# Patient Record
Sex: FEMALE | Race: WHITE | NOT HISPANIC OR LATINO | Employment: FULL TIME | URBAN - METROPOLITAN AREA
[De-identification: names, ages, dates, MRNs, and addresses within clinical notes are randomized per-mention and may not be internally consistent; named-entity substitution may affect disease eponyms.]

---

## 2018-11-10 ENCOUNTER — OFFICE VISIT (OUTPATIENT)
Dept: URGENT CARE | Facility: CLINIC | Age: 23
End: 2018-11-10
Payer: COMMERCIAL

## 2018-11-10 VITALS
HEART RATE: 99 BPM | TEMPERATURE: 99 F | HEIGHT: 65 IN | BODY MASS INDEX: 25.83 KG/M2 | OXYGEN SATURATION: 96 % | WEIGHT: 155 LBS | RESPIRATION RATE: 16 BRPM | SYSTOLIC BLOOD PRESSURE: 122 MMHG | DIASTOLIC BLOOD PRESSURE: 80 MMHG

## 2018-11-10 DIAGNOSIS — J06.9 UPPER RESPIRATORY TRACT INFECTION, UNSPECIFIED TYPE: ICD-10-CM

## 2018-11-10 DIAGNOSIS — H66.004 RECURRENT ACUTE SUPPURATIVE OTITIS MEDIA OF RIGHT EAR WITHOUT SPONTANEOUS RUPTURE OF TYMPANIC MEMBRANE: Primary | ICD-10-CM

## 2018-11-10 PROCEDURE — 96372 THER/PROPH/DIAG INJ SC/IM: CPT | Mod: S$GLB,,, | Performed by: EMERGENCY MEDICINE

## 2018-11-10 PROCEDURE — 99214 OFFICE O/P EST MOD 30 MIN: CPT | Mod: 25,S$GLB,, | Performed by: EMERGENCY MEDICINE

## 2018-11-10 PROCEDURE — 3008F BODY MASS INDEX DOCD: CPT | Mod: CPTII,S$GLB,, | Performed by: EMERGENCY MEDICINE

## 2018-11-10 RX ORDER — AMOXICILLIN AND CLAVULANATE POTASSIUM 875; 125 MG/1; MG/1
1 TABLET, FILM COATED ORAL 2 TIMES DAILY
Qty: 20 TABLET | Refills: 0 | Status: SHIPPED | OUTPATIENT
Start: 2018-11-10 | End: 2018-11-20

## 2018-11-10 RX ORDER — BETAMETHASONE SODIUM PHOSPHATE AND BETAMETHASONE ACETATE 3; 3 MG/ML; MG/ML
9 INJECTION, SUSPENSION INTRA-ARTICULAR; INTRALESIONAL; INTRAMUSCULAR; SOFT TISSUE ONCE
Status: COMPLETED | OUTPATIENT
Start: 2018-11-10 | End: 2018-11-10

## 2018-11-10 RX ORDER — AMOXICILLIN AND CLAVULANATE POTASSIUM 875; 125 MG/1; MG/1
1 TABLET, FILM COATED ORAL 2 TIMES DAILY
Qty: 20 TABLET | Refills: 0 | Status: SHIPPED | OUTPATIENT
Start: 2018-11-10 | End: 2018-11-10 | Stop reason: SDUPTHER

## 2018-11-10 RX ADMIN — BETAMETHASONE SODIUM PHOSPHATE AND BETAMETHASONE ACETATE 9 MG: 3; 3 INJECTION, SUSPENSION INTRA-ARTICULAR; INTRALESIONAL; INTRAMUSCULAR; SOFT TISSUE at 06:11

## 2018-11-10 NOTE — LETTER
November 12, 2018      [facility] [address] [place]  [facility] [address]  Phone: [phone]  Fax: [phone]       Patient: Allyssa Leong   YOB: 1995  Date of Visit: 11/12/2018    To Whom It May Concern:    Ana Leong  was at [facility] on 11/10/18. She may return to school on 11/13/18 with no restrictions. If you have any questions or concerns, or if I can be of further assistance, please do not hesitate to contact me.    Sincerely,    Lara Pinon MA

## 2018-11-10 NOTE — PROGRESS NOTES
"Subjective:       Patient ID: Allyssa Leong is a 23 y.o. female.    Vitals:  height is 5' 5" (1.651 m) and weight is 70.3 kg (155 lb). Her oral temperature is 98.6 °F (37 °C). Her blood pressure is 122/80 and her pulse is 99. Her respiration is 16 and oxygen saturation is 96%.     Chief Complaint: Otitis Media (right ear pain)    Pt c/o right ear pain, cough, nausea, and nasal congestion, x 2 days      Otalgia    There is pain in the right ear. This is a new problem. The current episode started yesterday. The problem occurs constantly. The problem has been gradually worsening. There has been no fever. The pain is at a severity of 5/10. The pain is mild. Associated symptoms include coughing, rhinorrhea and a sore throat. Pertinent negatives include no abdominal pain, headaches (x 2 days), rash or vomiting. She has tried nothing for the symptoms.     Review of Systems   Constitution: Negative for chills and fever.   HENT: Positive for congestion (nasal congestion), ear pain (right ear), hoarse voice, rhinorrhea and sore throat.    Eyes: Negative for blurred vision.   Cardiovascular: Negative for chest pain.   Respiratory: Positive for cough. Negative for shortness of breath.    Skin: Negative for rash.   Musculoskeletal: Negative for back pain and joint pain.   Gastrointestinal: Positive for nausea. Negative for abdominal pain and vomiting.   Neurological: Negative for headaches (x 2 days).   Psychiatric/Behavioral: The patient is not nervous/anxious.        Objective:      Physical Exam   Constitutional: She is oriented to person, place, and time. She appears well-developed and well-nourished. She is cooperative.  Non-toxic appearance. She does not appear ill. No distress.   HENT:   Head: Normocephalic and atraumatic.   Right Ear: Hearing, external ear and ear canal normal. Tympanic membrane is injected, erythematous and bulging.   Left Ear: Hearing, tympanic membrane, external ear and ear canal normal.   Nose: " Mucosal edema and rhinorrhea present. No nasal deformity. No epistaxis. Right sinus exhibits no maxillary sinus tenderness and no frontal sinus tenderness. Left sinus exhibits no maxillary sinus tenderness and no frontal sinus tenderness.   Mouth/Throat: Uvula is midline, oropharynx is clear and moist and mucous membranes are normal. No trismus in the jaw. Normal dentition. No uvula swelling. No posterior oropharyngeal erythema.   Eyes: Conjunctivae and lids are normal. No scleral icterus.   Sclera clear bilat   Neck: Trachea normal, full passive range of motion without pain and phonation normal. Neck supple.   Cardiovascular: Normal rate, regular rhythm, normal heart sounds, intact distal pulses and normal pulses.   Pulmonary/Chest: Effort normal and breath sounds normal. No respiratory distress.   Abdominal: Soft. Normal appearance and bowel sounds are normal. She exhibits no distension. There is no tenderness.   Musculoskeletal: Normal range of motion. She exhibits no edema or deformity.   Neurological: She is alert and oriented to person, place, and time. She exhibits normal muscle tone. Coordination normal.   Skin: Skin is warm, dry and intact. She is not diaphoretic. No pallor.   Psychiatric: She has a normal mood and affect. Her speech is normal and behavior is normal. Judgment and thought content normal. Cognition and memory are normal.   Nursing note and vitals reviewed.      Assessment:       1. Recurrent acute suppurative otitis media of right ear without spontaneous rupture of tympanic membrane    2. Upper respiratory tract infection, unspecified type        Plan:         Recurrent acute suppurative otitis media of right ear without spontaneous rupture of tympanic membrane    Upper respiratory tract infection, unspecified type    Other orders  -     amoxicillin-clavulanate 875-125mg (AUGMENTIN) 875-125 mg per tablet; Take 1 tablet by mouth 2 (two) times daily. for 10 days  Dispense: 20 tablet; Refill:  0  -     betamethasone acetate-betamethasone sodium phosphate injection 9 mg          Patient Instructions   Go to the Emergency Room if symptoms or condition worsens in any way.    Zyrtec, Claritin, or Allegra OTC as directed for the next 7 days    Flonase OTC (or your regular steroid nasal spray) as directed for the next 7 days    Salt Water Nasal Spray OTC 3x/day for the next 7 days    Tylenol 500mg 2 tabs by mouth every 8 hours  Motrin 400mg 2 tabs by mouth every 8 hours  Alternate Tylenol and Motrin every 4hours      Follow up with Primary Care or ENT if not improved in 7-10 Days:  [id number]      Middle Ear Infection (Adult)  You have an infection of the middle ear, the space behind the eardrum. This is also called acute otitis media (AOM). Sometimes it is caused by the common cold. This is because congestion can block the internal passage (eustachian tube) that drains fluid from the middle ear. When the middle ear fills with fluid, bacteria can grow there and cause an infection. Oral antibiotics are used to treat this illness, not ear drops. Symptoms usually start to improve within 1 to 2 days of treatment.    Home care  The following are general care guidelines:  · Finish all of the antibiotic medicine given, even though you may feel better after the first few days.  · You may use over-the-counter medicine, such as acetaminophen or ibuprofen, to control pain and fever, unless something else was prescribed. If you have chronic liver or kidney disease or have ever had a stomach ulcer or gastrointestinal bleeding, talk with your healthcare provider before using these medicines. Do not give aspirin to anyone under 18 years of age who has a fever. It may cause severe illness or death.  Follow-up care  Follow up with your healthcare provider, or as advised, in 2 weeks if all symptoms have not gotten better, or if hearing doesn't go back to normal within 1 month.  When to seek medical advice  Call your healthcare  provider right away if any of these occur:  · Ear pain gets worse or does not improve after 3 days of treatment  · Unusual drowsiness or confusion  · Neck pain, stiff neck, or headache  · Fluid or blood draining from the ear canal  · Fever of 100.4°F (38°C) or as advised   · Seizure  Date Last Reviewed: 6/1/2016  © 6895-1008 [employer]. [address] [place] [address], [place] [address]. All rights reserved. This information is not intended as a substitute for professional medical care. Always follow your healthcare professional's instructions.

## 2018-11-10 NOTE — PATIENT INSTRUCTIONS
Go to the Emergency Room if symptoms or condition worsens in any way.    Zyrtec, Claritin, or Allegra OTC as directed for the next 7 days    Flonase OTC (or your regular steroid nasal spray) as directed for the next 7 days    Salt Water Nasal Spray OTC 3x/day for the next 7 days    Tylenol 500mg 2 tabs by mouth every 8 hours  Motrin 400mg 2 tabs by mouth every 8 hours  Alternate Tylenol and Motrin every 4hours      Follow up with Primary Care or ENT if not improved in 7-10 Days:  [phone]      Middle Ear Infection (Adult)  You have an infection of the middle ear, the space behind the eardrum. This is also called acute otitis media (AOM). Sometimes it is caused by the common cold. This is because congestion can block the internal passage (eustachian tube) that drains fluid from the middle ear. When the middle ear fills with fluid, bacteria can grow there and cause an infection. Oral antibiotics are used to treat this illness, not ear drops. Symptoms usually start to improve within 1 to 2 days of treatment.    Home care  The following are general care guidelines:  · Finish all of the antibiotic medicine given, even though you may feel better after the first few days.  · You may use over-the-counter medicine, such as acetaminophen or ibuprofen, to control pain and fever, unless something else was prescribed. If you have chronic liver or kidney disease or have ever had a stomach ulcer or gastrointestinal bleeding, talk with your healthcare provider before using these medicines. Do not give aspirin to anyone under 18 years of age who has a fever. It may cause severe illness or death.  Follow-up care  Follow up with your healthcare provider, or as advised, in 2 weeks if all symptoms have not gotten better, or if hearing doesn't go back to normal within 1 month.  When to seek medical advice  Call your healthcare provider right away if any of these occur:  · Ear pain gets worse or does not improve after 3 days of  treatment  · Unusual drowsiness or confusion  · Neck pain, stiff neck, or headache  · Fluid or blood draining from the ear canal  · Fever of 100.4°F (38°C) or as advised   · Seizure  Date Last Reviewed: 6/1/2016  © 9110-0691 [employer]. [address] [place] [address]. All rights reserved. This information is not intended as a substitute for professional medical care. Always follow your healthcare professional's instructions.

## 2018-11-16 ENCOUNTER — TELEPHONE (OUTPATIENT)
Dept: URGENT CARE | Facility: CLINIC | Age: 23
End: 2018-11-16

## 2019-06-06 ENCOUNTER — OFFICE VISIT (OUTPATIENT)
Dept: URGENT CARE | Facility: CLINIC | Age: 24
End: 2019-06-06
Payer: COMMERCIAL

## 2019-06-06 VITALS
TEMPERATURE: 97 F | SYSTOLIC BLOOD PRESSURE: 109 MMHG | BODY MASS INDEX: 25.83 KG/M2 | DIASTOLIC BLOOD PRESSURE: 73 MMHG | RESPIRATION RATE: 16 BRPM | OXYGEN SATURATION: 99 % | WEIGHT: 155 LBS | HEART RATE: 91 BPM | HEIGHT: 65 IN

## 2019-06-06 DIAGNOSIS — J01.41 ACUTE RECURRENT PANSINUSITIS: Primary | ICD-10-CM

## 2019-06-06 PROBLEM — N94.10 DYSPAREUNIA, FEMALE: Status: ACTIVE | Noted: 2019-05-15

## 2019-06-06 PROBLEM — R10.2 PELVIC PAIN IN FEMALE: Status: ACTIVE | Noted: 2019-05-15

## 2019-06-06 PROBLEM — N92.0 MENORRHAGIA WITH REGULAR CYCLE: Status: ACTIVE | Noted: 2019-05-15

## 2019-06-06 PROBLEM — N60.02 CYST OF BREAST, LEFT, SOLITARY: Status: ACTIVE | Noted: 2018-10-12

## 2019-06-06 PROBLEM — N94.6 DYSMENORRHEA: Status: ACTIVE | Noted: 2019-05-15

## 2019-06-06 PROBLEM — N94.2 VAGINOSPASM: Status: ACTIVE | Noted: 2018-10-12

## 2019-06-06 PROBLEM — Z01.419 ENCOUNTER FOR GYNECOLOGICAL EXAMINATION WITHOUT ABNORMAL FINDING: Status: ACTIVE | Noted: 2018-10-12

## 2019-06-06 PROCEDURE — 99214 PR OFFICE/OUTPT VISIT, EST, LEVL IV, 30-39 MIN: ICD-10-PCS | Mod: 25,S$GLB,, | Performed by: FAMILY MEDICINE

## 2019-06-06 PROCEDURE — 3008F BODY MASS INDEX DOCD: CPT | Mod: CPTII,S$GLB,, | Performed by: FAMILY MEDICINE

## 2019-06-06 PROCEDURE — 96372 THER/PROPH/DIAG INJ SC/IM: CPT | Mod: S$GLB,,, | Performed by: FAMILY MEDICINE

## 2019-06-06 PROCEDURE — 96372 PR INJECTION,THERAP/PROPH/DIAG2ST, IM OR SUBCUT: ICD-10-PCS | Mod: S$GLB,,, | Performed by: FAMILY MEDICINE

## 2019-06-06 PROCEDURE — 99214 OFFICE O/P EST MOD 30 MIN: CPT | Mod: 25,S$GLB,, | Performed by: FAMILY MEDICINE

## 2019-06-06 PROCEDURE — 3008F PR BODY MASS INDEX (BMI) DOCUMENTED: ICD-10-PCS | Mod: CPTII,S$GLB,, | Performed by: FAMILY MEDICINE

## 2019-06-06 RX ORDER — AMOXICILLIN AND CLAVULANATE POTASSIUM 875; 125 MG/1; MG/1
1 TABLET, FILM COATED ORAL EVERY 12 HOURS
Qty: 14 TABLET | Refills: 0 | Status: SHIPPED | OUTPATIENT
Start: 2019-06-06 | End: 2019-06-13

## 2019-06-06 RX ORDER — BETAMETHASONE SODIUM PHOSPHATE AND BETAMETHASONE ACETATE 3; 3 MG/ML; MG/ML
6 INJECTION, SUSPENSION INTRA-ARTICULAR; INTRALESIONAL; INTRAMUSCULAR; SOFT TISSUE
Status: COMPLETED | OUTPATIENT
Start: 2019-06-06 | End: 2019-06-06

## 2019-06-06 RX ADMIN — BETAMETHASONE SODIUM PHOSPHATE AND BETAMETHASONE ACETATE 6 MG: 3; 3 INJECTION, SUSPENSION INTRA-ARTICULAR; INTRALESIONAL; INTRAMUSCULAR; SOFT TISSUE at 10:06

## 2019-06-06 NOTE — PROGRESS NOTES
"Subjective:       Patient ID: Allyssa Leong is a 23 y.o. female.    Vitals:    06/06/19 0925   BP: 109/73   Pulse: 91   Resp: 16   Temp: 97.1 °F (36.2 °C)   TempSrc: Oral   SpO2: 99%   Weight: 70.3 kg (155 lb)   Height: 5' 5" (1.651 m)       Chief Complaint: URI    Pt states 2-3 days congestion. Pt states today pressure in ears, sinus pain/pressure, HA, sore throat, post nasal drip. No relief with benadryl and nasal spray she thinks astelin. ENT in  where she goes to school, [employer]. States if she lets this linger on most likely will get ear infection. Deals with this frequently. Steroid and abx works best. Can take augmentin without rxn.     URI    This is a new problem. The current episode started in the past 7 days. The problem has been gradually worsening. There has been no fever. Associated symptoms include congestion, ear pain, headaches and sinus pain. Pertinent negatives include no abdominal pain, chest pain, diarrhea, joint pain, nausea, rash, sore throat or vomiting. Treatments tried: benadryl  The treatment provided no relief.     Review of Systems   Constitution: Positive for malaise/fatigue. Negative for chills and fever.   HENT: Positive for congestion, ear pain and sinus pain. Negative for sore throat.    Eyes: Negative for blurred vision.   Cardiovascular: Negative for chest pain.   Respiratory: Negative for shortness of breath.    Skin: Negative for rash.   Musculoskeletal: Negative for back pain and joint pain.   Gastrointestinal: Negative for abdominal pain, diarrhea, nausea and vomiting.   Neurological: Positive for headaches.   Psychiatric/Behavioral: The patient is not nervous/anxious.        Objective:      Physical Exam   Constitutional: She is oriented to person, place, and time. She appears well-developed and well-nourished. She is cooperative.  Non-toxic appearance. She does not appear ill. No distress.   HENT:   Head: Normocephalic and atraumatic.   Right Ear: Hearing, tympanic " membrane, external ear and ear canal normal. Tympanic membrane is not erythematous. No middle ear effusion.   Left Ear: Hearing, external ear and ear canal normal. Tympanic membrane is bulging (clear). Tympanic membrane is not erythematous.  No middle ear effusion.   Nose: Mucosal edema and rhinorrhea (thick green) present. No nasal deformity. No epistaxis. Right sinus exhibits maxillary sinus tenderness and frontal sinus tenderness. Left sinus exhibits maxillary sinus tenderness and frontal sinus tenderness.   Mouth/Throat: Uvula is midline, oropharynx is clear and moist and mucous membranes are normal. No trismus in the jaw. Normal dentition. No uvula swelling. No oropharyngeal exudate or posterior oropharyngeal erythema.   Eyes: Conjunctivae and lids are normal. No scleral icterus.   Sclera clear bilat   Neck: Trachea normal, full passive range of motion without pain and phonation normal. Neck supple.   Cardiovascular: Normal rate, regular rhythm, normal heart sounds, intact distal pulses and normal pulses.   Pulmonary/Chest: Effort normal and breath sounds normal. No respiratory distress. She has no wheezes.   Abdominal: Soft. Normal appearance and bowel sounds are normal. She exhibits no distension. There is no tenderness.   Musculoskeletal: Normal range of motion. She exhibits no edema or deformity.   Neurological: She is alert and oriented to person, place, and time. She exhibits normal muscle tone. Coordination normal.   Skin: Skin is warm, dry and intact. She is not diaphoretic. No pallor.   Psychiatric: She has a normal mood and affect. Her speech is normal and behavior is normal. Judgment and thought content normal. Cognition and memory are normal.   Nursing note and vitals reviewed.      Assessment:       1. Acute recurrent pansinusitis        Plan:       Allyssa was seen today for uri.    Diagnoses and all orders for this visit:    Acute recurrent pansinusitis  -     betamethasone acetate-betamethasone  sodium phosphate injection 6 mg    Other orders  -     amoxicillin-clavulanate 875-125mg (AUGMENTIN) 875-125 mg per tablet; Take 1 tablet by mouth every 12 (twelve) hours. for 7 days    discussed risk with frequent steroids, last in march. She would like inj over PO medication as she has school work to do. Advised only take abx if severe ear pain, fever, sinus sx for 7-10 days and worsening. Pt in agreement    Patient Instructions   PLEASE READ YOUR DISCHARGE INSTRUCTIONS ENTIRELY AS IT CONTAINS IMPORTANT INFORMATION.      Please drink plenty of fluids.    Please get plenty of rest.    Please return here or go to the Emergency Department for any concerns or worsening of condition.    Please take an over the counter antihistamine medication (allegra/Claritin/Zyrtec) of your choice as directed.    Try an over the counter decongestant like Mucinex D or Sudafed.     If you do have Hypertension or palpitations, it is safe to take Coricidin HBP for relief of sinus symptoms.    If not allergic, please take over the counter Tylenol (Acetaminophen) and/or Motrin (Ibuprofen) as directed for control of pain and/or fever.  Please follow up with your primary care doctor or specialist as needed.    Sore throat recommendations: Warm fluids, warm salt water gargles, throat lozenges, tea, honey, soup, rest, hydration.    Use over the counter flonase: one spray each nostril twice daily OR two sprays each nostril once daily.     If you  smoke, please stop smoking.    You received a steroid today - this can elevate your blood pressure, elevate your blood sugar, water weight gain, nervous energy, redness to the face and dimpling of the skin where the shot goes in if you had an injection.   Do not use steroids more than 3 times per year.   If you have diabetes, please check you blood sugar frequently.  If you have high blood pressure, please check your blood pressure frequently.       Please return or see your primary care doctor if you  develop new or worsening symptoms.     Please arrange follow up with your primary medical clinic as soon as possible. You must understand that you've received an Urgent Care treatment only and that you may be released before all of your medical problems are known or treated. You, the patient, will arrange for follow up as instructed. If your symptoms worsen or fail to improve you should go to the Emergency Room.    Sinusitis (No Antibiotics)    The sinuses are air-filled spaces within the bones of the face. They connect to the inside of the nose. Sinusitis is an inflammation of the tissue lining the sinus cavity. Sinus inflammation can occur during a cold. It can also be due to allergies to pollens and other particles in the air. It can cause symptoms such as sinus congestion, headache, sore throat, facial swelling and fullness. It may also cause a low-grade fever. No infection is present, and no antibiotic treatment is needed.  Home care  · Drink plenty of water, hot tea, and other liquids. This may help thin mucus. It also may promote sinus drainage.  · Heat may help soothe painful areas of the face. Use a towel soaked in hot water. Or,  the shower and direct the hot spray onto your face. Using a vaporizer along with a menthol rub at night may also help.   · An expectorant containing guaifenesin may help thin the mucus and promote drainage from the sinuses.  · Over-the-counter decongestants may be used unless a similar medicine was prescribed. Nasal sprays work the fastest. Use one that contains phenylephrine or oxymetazoline. First blow the nose gently. Then use the spray. Do not use these medicines more often than directed on the label or symptoms may get worse. You may also use tablets containing pseudoephedrine. Avoid products that combine ingredients, because side effects may be increased. Read labels. You can also ask the pharmacist for help. (NOTE: Persons with high blood pressure should not use  decongestants. They can raise blood pressure.)  · Over-the-counter antihistamines may help if allergies contributed to your sinusitis.    · Use acetaminophen or ibuprofen to control pain, unless another pain medicine was prescribed. (If you have chronic liver or kidney disease or ever had a stomach ulcer, talk with your doctor before using these medicines. Aspirin should never be used in anyone under 18 years of age who is ill with a fever. It may cause severe liver damage.)  · Use nasal rinses or irrigation as instructed by your health care provider.  · Don't smoke. This can worsen symptoms.  Follow-up care  Follow up with your healthcare provider or our staff if you are not improving within the next week.  When to seek medical advice  Call your healthcare provider if any of these occur:  · Green or yellow discharge from the nose or into the throat  · Facial pain or headache becoming more severe  · Stiff neck  · Unusual drowsiness or confusion  · Swelling of the forehead or eyelids  · Vision problems, including blurred or double vision  · Fever of 100.4ºF (38ºC) or higher, or as directed by your healthcare provider  · Seizure  · Breathing problems  · Symptoms not resolving within 10 days  Date Last Reviewed: 4/13/2015  © 4985-5259 [employer]. [address] [place] [address], [place] [address]. All rights reserved. This information is not intended as a substitute for professional medical care. Always follow your healthcare professional's instructions.

## 2019-06-06 NOTE — PATIENT INSTRUCTIONS
PLEASE READ YOUR DISCHARGE INSTRUCTIONS ENTIRELY AS IT CONTAINS IMPORTANT INFORMATION.      Please drink plenty of fluids.    Please get plenty of rest.    Please return here or go to the Emergency Department for any concerns or worsening of condition.    Please take an over the counter antihistamine medication (allegra/Claritin/Zyrtec) of your choice as directed.    Try an over the counter decongestant like Mucinex D or Sudafed.     If you do have Hypertension or palpitations, it is safe to take Coricidin HBP for relief of sinus symptoms.    If not allergic, please take over the counter Tylenol (Acetaminophen) and/or Motrin (Ibuprofen) as directed for control of pain and/or fever.  Please follow up with your primary care doctor or specialist as needed.    Sore throat recommendations: Warm fluids, warm salt water gargles, throat lozenges, tea, honey, soup, rest, hydration.    Use over the counter flonase: one spray each nostril twice daily OR two sprays each nostril once daily.     If you  smoke, please stop smoking.    You received a steroid today - this can elevate your blood pressure, elevate your blood sugar, water weight gain, nervous energy, redness to the face and dimpling of the skin where the shot goes in if you had an injection.   Do not use steroids more than 3 times per year.   If you have diabetes, please check you blood sugar frequently.  If you have high blood pressure, please check your blood pressure frequently.       Please return or see your primary care doctor if you develop new or worsening symptoms.     Please arrange follow up with your primary medical clinic as soon as possible. You must understand that you've received an Urgent Care treatment only and that you may be released before all of your medical problems are known or treated. You, the patient, will arrange for follow up as instructed. If your symptoms worsen or fail to improve you should go to the Emergency Room.    Sinusitis (No  Antibiotics)    The sinuses are air-filled spaces within the bones of the face. They connect to the inside of the nose. Sinusitis is an inflammation of the tissue lining the sinus cavity. Sinus inflammation can occur during a cold. It can also be due to allergies to pollens and other particles in the air. It can cause symptoms such as sinus congestion, headache, sore throat, facial swelling and fullness. It may also cause a low-grade fever. No infection is present, and no antibiotic treatment is needed.  Home care  · Drink plenty of water, hot tea, and other liquids. This may help thin mucus. It also may promote sinus drainage.  · Heat may help soothe painful areas of the face. Use a towel soaked in hot water. Or,  the shower and direct the hot spray onto your face. Using a vaporizer along with a menthol rub at night may also help.   · An expectorant containing guaifenesin may help thin the mucus and promote drainage from the sinuses.  · Over-the-counter decongestants may be used unless a similar medicine was prescribed. Nasal sprays work the fastest. Use one that contains phenylephrine or oxymetazoline. First blow the nose gently. Then use the spray. Do not use these medicines more often than directed on the label or symptoms may get worse. You may also use tablets containing pseudoephedrine. Avoid products that combine ingredients, because side effects may be increased. Read labels. You can also ask the pharmacist for help. (NOTE: Persons with high blood pressure should not use decongestants. They can raise blood pressure.)  · Over-the-counter antihistamines may help if allergies contributed to your sinusitis.    · Use acetaminophen or ibuprofen to control pain, unless another pain medicine was prescribed. (If you have chronic liver or kidney disease or ever had a stomach ulcer, talk with your doctor before using these medicines. Aspirin should never be used in anyone under 18 years of age who is ill with  a fever. It may cause severe liver damage.)  · Use nasal rinses or irrigation as instructed by your health care provider.  · Don't smoke. This can worsen symptoms.  Follow-up care  Follow up with your healthcare provider or our staff if you are not improving within the next week.  When to seek medical advice  Call your healthcare provider if any of these occur:  · Green or yellow discharge from the nose or into the throat  · Facial pain or headache becoming more severe  · Stiff neck  · Unusual drowsiness or confusion  · Swelling of the forehead or eyelids  · Vision problems, including blurred or double vision  · Fever of 100.4ºF (38ºC) or higher, or as directed by your healthcare provider  · Seizure  · Breathing problems  · Symptoms not resolving within 10 days  Date Last Reviewed: 4/13/2015  © 5047-4443 [employer]. [address] [place] [address], [place] [address]. All rights reserved. This information is not intended as a substitute for professional medical care. Always follow your healthcare professional's instructions.

## 2020-06-15 ENCOUNTER — TELEPHONE (OUTPATIENT)
Dept: FAMILY MEDICINE | Facility: CLINIC | Age: 25
End: 2020-06-15

## 2020-06-15 NOTE — TELEPHONE ENCOUNTER
----- Message from Vale Tubbs sent at 6/12/2020  6:05 PM CDT -----  Contact: pt  Type:  Needs Medical Advice/ Appointment request    Who Called: Patient  Reason: checkup and mental health evaluation  Would the patient rather a call back or a response via [facility]? [facility]  Best Call Back Number: [phone]  Additional Information: new patient

## 2020-08-07 ENCOUNTER — PATIENT OUTREACH (OUTPATIENT)
Dept: ADMINISTRATIVE | Facility: HOSPITAL | Age: 25
End: 2020-08-07

## 2020-08-07 NOTE — PROGRESS NOTES
Chart review done.  updated. Immunizations reviewed & updated. Care Everywhere updated.   updated with pap results.

## 2020-08-21 ENCOUNTER — OFFICE VISIT (OUTPATIENT)
Dept: INTERNAL MEDICINE | Facility: CLINIC | Age: 25
End: 2020-08-21
Payer: COMMERCIAL

## 2020-08-21 ENCOUNTER — LAB VISIT (OUTPATIENT)
Dept: LAB | Facility: HOSPITAL | Age: 25
End: 2020-08-21
Attending: INTERNAL MEDICINE
Payer: COMMERCIAL

## 2020-08-21 VITALS
RESPIRATION RATE: 16 BRPM | HEART RATE: 71 BPM | TEMPERATURE: 98 F | BODY MASS INDEX: 30.12 KG/M2 | HEIGHT: 65 IN | SYSTOLIC BLOOD PRESSURE: 128 MMHG | WEIGHT: 180.75 LBS | OXYGEN SATURATION: 99 % | DIASTOLIC BLOOD PRESSURE: 80 MMHG

## 2020-08-21 DIAGNOSIS — F41.9 ANXIETY: ICD-10-CM

## 2020-08-21 DIAGNOSIS — Z00.00 ANNUAL PHYSICAL EXAM: Primary | ICD-10-CM

## 2020-08-21 DIAGNOSIS — R41.840 DIFFICULTY CONCENTRATING: ICD-10-CM

## 2020-08-21 DIAGNOSIS — Z00.00 ANNUAL PHYSICAL EXAM: ICD-10-CM

## 2020-08-21 PROCEDURE — 99999 PR PBB SHADOW E&M-EST. PATIENT-LVL IV: CPT | Mod: PBBFAC,,, | Performed by: INTERNAL MEDICINE

## 2020-08-21 PROCEDURE — 85025 COMPLETE CBC W/AUTO DIFF WBC: CPT

## 2020-08-21 PROCEDURE — 99385 PREV VISIT NEW AGE 18-39: CPT | Mod: S$GLB,,, | Performed by: INTERNAL MEDICINE

## 2020-08-21 PROCEDURE — 83036 HEMOGLOBIN GLYCOSYLATED A1C: CPT

## 2020-08-21 PROCEDURE — 99999 PR PBB SHADOW E&M-EST. PATIENT-LVL IV: ICD-10-PCS | Mod: PBBFAC,,, | Performed by: INTERNAL MEDICINE

## 2020-08-21 PROCEDURE — 82306 VITAMIN D 25 HYDROXY: CPT

## 2020-08-21 PROCEDURE — 80053 COMPREHEN METABOLIC PANEL: CPT

## 2020-08-21 PROCEDURE — 99385 PR PREVENTIVE VISIT,NEW,18-39: ICD-10-PCS | Mod: S$GLB,,, | Performed by: INTERNAL MEDICINE

## 2020-08-21 PROCEDURE — 36415 COLL VENOUS BLD VENIPUNCTURE: CPT | Mod: PO

## 2020-08-21 PROCEDURE — 80061 LIPID PANEL: CPT

## 2020-08-21 PROCEDURE — 84443 ASSAY THYROID STIM HORMONE: CPT

## 2020-08-21 RX ORDER — ELAGOLIX 200 MG/1
TABLET, FILM COATED ORAL
COMMUNITY
Start: 2020-08-10 | End: 2022-03-23

## 2020-08-21 RX ORDER — ESCITALOPRAM OXALATE 10 MG/1
TABLET ORAL
COMMUNITY
Start: 2020-06-24 | End: 2021-03-25

## 2020-08-21 NOTE — PROGRESS NOTES
Subjective:       Patient ID: Allyssa Leong is a 25 y.o. female.    Chief Complaint: Establish Care, Weight Gain, and Anxiety    HPI     25 y.o. female here for annual exam.     Health Maintenance/ Screening:   Labs: due  Cervical Cancer:  Pap 2018         Vaccines:   Influenza:    Tetanus: 2011          History reviewed. No pertinent past medical history.  History reviewed. No pertinent surgical history.  Family History   Problem Relation Age of Onset    Breast cancer Mother     Cancer Father         [place] [facility]    Autism Brother     ADD / ADHD Brother      Social History     Socioeconomic History    Marital status: Single     Spouse name: Not on file    Number of children: Not on file    Years of education: Not on file    Highest education level: Not on file   Occupational History    Not on file   Social Needs    Financial resource strain: Not hard at all    Food insecurity     Worry: Never true     Inability: Never true    Transportation needs     Medical: No     Non-medical: No   Tobacco Use    Smoking status: Never Smoker    Smokeless tobacco: Never Used   Substance and Sexual Activity    Alcohol use: No     Frequency: Monthly or less     Drinks per session: 1 or 2     Binge frequency: Never    Drug use: Not on file    Sexual activity: Not on file   Lifestyle    Physical activity     Days per week: 3 days     Minutes per session: 20 min    Stress: Very much   Relationships    Social connections     Talks on phone: Three times a week     Gets together: Once a week     Attends Alevism service: Not on file     Active member of club or organization: Yes     Attends meetings of clubs or organizations: 1 to 4 times per year     Relationship status: Never    Other Topics Concern    Not on file   Social History Narrative    Not on file     Review of patient's allergies indicates:   Allergen Reactions    Penicillins Other (See Comments)     unknown       Current Outpatient  "Medications:     ORILISSA 200 mg Tab, TK 1 T PO BID, Disp: , Rfl:     escitalopram oxalate (LEXAPRO) 10 MG tablet, , Disp: , Rfl:     ondansetron (ZOFRAN-ODT) 4 MG TbDL, Take 1 tablet (4 mg total) by mouth every 8 (eight) hours as needed., Disp: 20 tablet, Rfl: 0        Review of Systems   Constitutional: Positive for unexpected weight change. Negative for activity change.   HENT: Negative for hearing loss, rhinorrhea and trouble swallowing.    Eyes: Negative for discharge and visual disturbance.   Respiratory: Negative for chest tightness and wheezing.    Cardiovascular: Negative for chest pain and palpitations.   Gastrointestinal: Negative for blood in stool, constipation, diarrhea and vomiting.   Endocrine: Negative for polydipsia and polyuria.   Genitourinary: Positive for menstrual problem. Negative for difficulty urinating, dysuria and hematuria.   Musculoskeletal: Negative for arthralgias, joint swelling and neck pain.   Neurological: Negative for weakness and headaches.   Psychiatric/Behavioral: Positive for decreased concentration and dysphoric mood. Negative for confusion. The patient is nervous/anxious.        Objective:        Vitals:    08/21/20 1339   BP: 128/80   BP Location: Right arm   Patient Position: Sitting   BP Method: Medium (Manual)   Pulse: 71   Resp: 16   Temp: 98.2 °F (36.8 °C)   TempSrc: Temporal   SpO2: 99%   Weight: 82 kg (180 lb 12.4 oz)   Height: 5' 5" (1.651 m)       Body mass index is 30.08 kg/m².    Physical Exam  Constitutional:       General: She is not in acute distress.     Appearance: She is well-developed. She is not diaphoretic.   HENT:      Head: Normocephalic and atraumatic.      Right Ear: Tympanic membrane normal.      Left Ear: Tympanic membrane normal.   Eyes:      Conjunctiva/sclera: Conjunctivae normal.   Neck:      Musculoskeletal: Normal range of motion and neck supple.      Thyroid: No thyromegaly.      Trachea: No tracheal deviation.   Cardiovascular:      " Rate and Rhythm: Normal rate and regular rhythm.      Heart sounds: Normal heart sounds.   Pulmonary:      Effort: Pulmonary effort is normal.      Breath sounds: Normal breath sounds.   Abdominal:      General: Bowel sounds are normal. There is no distension.      Palpations: Abdomen is soft.      Tenderness: There is no abdominal tenderness.   Musculoskeletal:         General: No swelling.   Lymphadenopathy:      Cervical: No cervical adenopathy.   Skin:     General: Skin is warm and dry.      Nails: There is no clubbing.     Neurological:      Mental Status: She is alert and oriented to person, place, and time.      Sensory: No sensory deficit.   Psychiatric:         Mood and Affect: Mood is anxious. Affect is tearful.         Behavior: Behavior normal.         Judgment: Judgment normal.         Assessment:     1. Annual physical exam    2. Anxiety    3. Difficulty concentrating           Plan:         1. Annual physical exam  - CBC auto differential; Future  - Comprehensive metabolic panel; Future  - Hemoglobin A1C; Future  - Lipid Panel; Future  - TSH; Future  - Vitamin D; Future    2. Anxiety  - start lexapro 10mg daily- request refill when needed  - Ambulatory referral/consult to Psychiatry; Future    3. Difficulty concentrating  - Ambulatory referral/consult to Psychiatry; Future           Patient note was created using MModal Dictation.  Any errors in syntax or even information may not have been identified and edited on initial review prior to signing this note.

## 2020-08-21 NOTE — PATIENT INSTRUCTIONS
Given the sensitive nature of Psychiatry visits, you will need to call to schedule your own appointment as our referral coordinator can not schedule this for you. The number for that department is [phone].

## 2020-08-22 PROBLEM — E55.9 VITAMIN D INSUFFICIENCY: Status: ACTIVE | Noted: 2020-08-22

## 2020-08-22 LAB
25(OH)D3+25(OH)D2 SERPL-MCNC: 16 NG/ML (ref 30–96)
ALBUMIN SERPL BCP-MCNC: 4.2 G/DL (ref 3.5–5.2)
ALP SERPL-CCNC: 70 U/L (ref 55–135)
ALT SERPL W/O P-5'-P-CCNC: 12 U/L (ref 10–44)
ANION GAP SERPL CALC-SCNC: 7 MMOL/L (ref 8–16)
AST SERPL-CCNC: 16 U/L (ref 10–40)
BASOPHILS # BLD AUTO: 0.03 K/UL (ref 0–0.2)
BASOPHILS NFR BLD: 0.4 % (ref 0–1.9)
BILIRUB SERPL-MCNC: 0.6 MG/DL (ref 0.1–1)
BUN SERPL-MCNC: 12 MG/DL (ref 6–20)
CALCIUM SERPL-MCNC: 9.5 MG/DL (ref 8.7–10.5)
CHLORIDE SERPL-SCNC: 106 MMOL/L (ref 95–110)
CHOLEST SERPL-MCNC: 200 MG/DL (ref 120–199)
CHOLEST/HDLC SERPL: 3.5 {RATIO} (ref 2–5)
CO2 SERPL-SCNC: 25 MMOL/L (ref 23–29)
CREAT SERPL-MCNC: 0.8 MG/DL (ref 0.5–1.4)
DIFFERENTIAL METHOD: ABNORMAL
EOSINOPHIL # BLD AUTO: 0.6 K/UL (ref 0–0.5)
EOSINOPHIL NFR BLD: 8.6 % (ref 0–8)
ERYTHROCYTE [DISTWIDTH] IN BLOOD BY AUTOMATED COUNT: 13.7 % (ref 11.5–14.5)
EST. GFR  (AFRICAN AMERICAN): >60 ML/MIN/1.73 M^2
EST. GFR  (NON AFRICAN AMERICAN): >60 ML/MIN/1.73 M^2
ESTIMATED AVG GLUCOSE: 103 MG/DL (ref 68–131)
GLUCOSE SERPL-MCNC: 83 MG/DL (ref 70–110)
HBA1C MFR BLD HPLC: 5.2 % (ref 4–5.6)
HCT VFR BLD AUTO: 39.7 % (ref 37–48.5)
HDLC SERPL-MCNC: 57 MG/DL (ref 40–75)
HDLC SERPL: 28.5 % (ref 20–50)
HGB BLD-MCNC: 12 G/DL (ref 12–16)
IMM GRANULOCYTES # BLD AUTO: 0.03 K/UL (ref 0–0.04)
IMM GRANULOCYTES NFR BLD AUTO: 0.4 % (ref 0–0.5)
LDLC SERPL CALC-MCNC: 130.6 MG/DL (ref 63–159)
LYMPHOCYTES # BLD AUTO: 2.1 K/UL (ref 1–4.8)
LYMPHOCYTES NFR BLD: 29.8 % (ref 18–48)
MCH RBC QN AUTO: 25 PG (ref 27–31)
MCHC RBC AUTO-ENTMCNC: 30.2 G/DL (ref 32–36)
MCV RBC AUTO: 83 FL (ref 82–98)
MONOCYTES # BLD AUTO: 0.4 K/UL (ref 0.3–1)
MONOCYTES NFR BLD: 5.2 % (ref 4–15)
NEUTROPHILS # BLD AUTO: 4 K/UL (ref 1.8–7.7)
NEUTROPHILS NFR BLD: 55.6 % (ref 38–73)
NONHDLC SERPL-MCNC: 143 MG/DL
NRBC BLD-RTO: 0 /100 WBC
PLATELET # BLD AUTO: 239 K/UL (ref 150–350)
PMV BLD AUTO: 11 FL (ref 9.2–12.9)
POTASSIUM SERPL-SCNC: 4 MMOL/L (ref 3.5–5.1)
PROT SERPL-MCNC: 7.3 G/DL (ref 6–8.4)
RBC # BLD AUTO: 4.8 M/UL (ref 4–5.4)
SODIUM SERPL-SCNC: 138 MMOL/L (ref 136–145)
TRIGL SERPL-MCNC: 62 MG/DL (ref 30–150)
TSH SERPL DL<=0.005 MIU/L-ACNC: 1.31 UIU/ML (ref 0.4–4)
WBC # BLD AUTO: 7.11 K/UL (ref 3.9–12.7)

## 2020-09-08 ENCOUNTER — OFFICE VISIT (OUTPATIENT)
Dept: URGENT CARE | Facility: CLINIC | Age: 25
End: 2020-09-08
Payer: OTHER MISCELLANEOUS

## 2020-09-08 VITALS
RESPIRATION RATE: 16 BRPM | HEART RATE: 74 BPM | WEIGHT: 175 LBS | HEIGHT: 65 IN | TEMPERATURE: 99 F | OXYGEN SATURATION: 98 % | DIASTOLIC BLOOD PRESSURE: 72 MMHG | BODY MASS INDEX: 29.16 KG/M2 | SYSTOLIC BLOOD PRESSURE: 110 MMHG
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
## 2020-09-08 PROCEDURE — 99203 OFFICE O/P NEW LOW 30 MIN: CPT | Mod: S$GLB,,, | Performed by: PHYSICIAN ASSISTANT

## 2020-09-08 PROCEDURE — 99203 PR OFFICE/OUTPT VISIT, NEW, LEVL III, 30-44 MIN: ICD-10-PCS | Mod: S$GLB,,, | Performed by: PHYSICIAN ASSISTANT

## 2020-09-08 RX ORDER — CLINDAMYCIN HYDROCHLORIDE 300 MG/1
300 CAPSULE ORAL 3 TIMES DAILY
Qty: 21 CAPSULE | Refills: 0 | Status: SHIPPED | OUTPATIENT
Start: 2020-09-08 | End: 2020-09-15

## 2020-09-08 RX ORDER — IBUPROFEN 800 MG/1
800 TABLET ORAL EVERY 6 HOURS PRN
Qty: 30 TABLET | Refills: 0 | Status: SHIPPED | OUTPATIENT
Start: 2020-09-08 | End: 2021-03-25

## 2020-09-08 RX ORDER — DOXYCYCLINE HYCLATE 100 MG
100 TABLET ORAL 2 TIMES DAILY
Qty: 14 TABLET | Refills: 0 | Status: SHIPPED | OUTPATIENT
Start: 2020-09-08 | End: 2021-03-25

## 2020-09-08 NOTE — PROGRESS NOTES
Subjective:       Patient ID: Allyssa Leong is a 25 y.o. female.    Chief Complaint: Animal Bite (Lt Index finger)    Pt is being seen here today for a cat bite on 9/8/2020.  Pt states that she was bit by a cat on her left index finger.  Her pain level is 2/10.  Her last tetanus was 2011.  KD      Constitution: Negative for fatigue.   HENT: Negative for facial swelling and facial trauma.    Neck: Negative for neck stiffness.   Cardiovascular: Negative for chest trauma.   Eyes: Negative for eye trauma, double vision and blurred vision.   Gastrointestinal: Negative for abdominal trauma, abdominal pain and rectal bleeding.   Genitourinary: Negative for hematuria, missed menses, genital trauma and pelvic pain.   Musculoskeletal: Negative for pain, trauma, joint swelling and abnormal ROM of joint.   Skin: Positive for wound and puncture wound. Negative for color change, abrasion, laceration and bruising.   Neurological: Negative for dizziness, history of vertigo, light-headedness, coordination disturbances, altered mental status and loss of consciousness.   Hematologic/Lymphatic: Negative for history of bleeding disorder.   Psychiatric/Behavioral: Negative for altered mental status.        Objective:      Physical Exam  Vitals signs and nursing note reviewed.   Constitutional:       General: She is not in acute distress.     Appearance: She is well-developed. She is not diaphoretic.   HENT:      Head: Normocephalic and atraumatic.      Right Ear: Hearing and external ear normal.      Left Ear: Hearing and external ear normal.      Nose: Nose normal. No nasal deformity.   Eyes:      General: Lids are normal. No scleral icterus.     Conjunctiva/sclera: Conjunctivae normal.   Neck:      Musculoskeletal: Normal range of motion.      Trachea: Trachea normal.   Cardiovascular:      Pulses: Normal pulses.   Pulmonary:      Effort: Pulmonary effort is normal. No respiratory distress.      Breath sounds: No stridor.    Musculoskeletal:      Left hand: She exhibits normal range of motion, no tenderness and normal capillary refill. Normal sensation noted. Normal strength noted.        Hands:    Skin:     General: Skin is warm and dry.      Capillary Refill: Capillary refill takes less than 2 seconds.   Neurological:      Mental Status: She is alert. She is not disoriented.      GCS: GCS eye subscore is 4. GCS verbal subscore is 5. GCS motor subscore is 6.      Sensory: No sensory deficit.   Psychiatric:         Attention and Perception: She is attentive.         Speech: Speech normal.         Behavior: Behavior normal.         Assessment:       1. Cat bite, initial encounter        Plan:       Td IM x 1    Medications Ordered This Encounter   Medications    clindamycin (CLEOCIN) 300 MG capsule     Sig: Take 1 capsule (300 mg total) by mouth 3 (three) times daily. for 7 days     Dispense:  21 capsule     Refill:  0    doxycycline (VIBRA-TABS) 100 MG tablet     Sig: Take 1 tablet (100 mg total) by mouth 2 (two) times daily.     Dispense:  14 tablet     Refill:  0    ibuprofen (ADVIL,MOTRIN) 800 MG tablet     Sig: Take 1 tablet (800 mg total) by mouth every 6 (six) hours as needed for Pain. Take with meals.     Dispense:  30 tablet     Refill:  0         Restrictions: Regular Duty  Follow up in about 2 days (around 9/10/2020) for wound check.        Patient Instructions     Cat Bite    A cat bite can cause a wound deep enough to break the skin. In such cases, the wound is cleaned and then sometimes closed. If the wound is closed it is usually not closed completely. This is so that fluid can drain if the wound becomes infected. Often the wound is left open to heal. In addition to wound care, a tetanus shot may be given, if needed.  Home care  · Wash your hands well with soap and warm water before and after caring for the wound. This helps lower the risk of infection.  · Care for the wound as directed. If a dressing was applied to  the wound, be sure to change it as directed.  · If the wound bleeds, place a clean, soft cloth on the wound. Then firmly apply pressure until the bleeding stops. This may take up to 5 minutes. Do not release the pressure and look at the wound during this time.  · Always get medical attention for cat bites on the hand. They are highly likely to become infected.  · Most wounds heal within 10 days. But an infection can occur even with proper treatment. So be sure to check the wound daily for signs of infection (see below).  · Antibiotics may be prescribed. These help prevent or treat infection. If youre given antibiotics, take them as directed. Also be sure to complete the medicines.  Rabies prevention  Rabies is a virus that can be carried in certain animals. These can include domestic animals such as cats and dogs. Pets fully vaccinated against rabies (2 shots) are at very low risk of infection. But because human rabies is almost always fatal, any biting pet should be confined for 10 days as an extra precaution. In general, if there is a risk for rabies, the following steps may need to be taken:  · If someones pet cat has bitten you, it should be kept in a secure area for the next 10 days to watch for signs of illness. (If the pet owner wont allow this, contact your local animal control center.) If the cat becomes ill or dies during that time, contact your local animal control center at once so the animal may be tested for rabies. If the cat stays healthy for the next 10 days, there is no danger of rabies in the animal or you.  · If a stray cat bit you, contact your local animal control center. They can give information on capture, quarantine, and animal rabies testing.  · If you cant find the animal that bit you in the next 2 days, and if rabies exists in your area, you may need to receive the rabies vaccine series. Call your healthcare provider right away. Or return to the emergency department promptly.  · All  animal bites should be reported to the local animal control center. If you were not given a form to fill out, you can report this yourself.  Follow-up care  Follow up with your healthcare provider, or as directed.  When to seek medical advice  Call your healthcare provider right away if any of these occur:  · Signs of infection:  ¨ Spreading redness or warmth from the wound  ¨ Increased pain or swelling  ¨ Fever of 100.4ºF (38ºC) or higher, or as directed by your healthcare provider  ¨ Colored fluid or pus draining from the wound  ¨ Enlarged lymph nodes above the area that was bitten, such as lymph nodes in the armpit if you were bitten on the hand or arm. This may be a sign of cat-scratch disease (cat-scratch fever).  · Signs of rabies infection:  ¨ Headache  ¨ Confusion  ¨ Strange behavior  ¨ Increased salivating or drooling  ¨ Seizure  · Decreased ability to move any body part near the bite area  · Bleeding that can't be stopped after 5 minutes of firm pressure  Date Last Reviewed: 3/23/2015  © 7243-0626 [employer]. [address] [place] [address], [place] [address]. All rights reserved. This information is not intended as a substitute for professional medical care. Always follow your healthcare professional's instructions.

## 2020-09-08 NOTE — PATIENT INSTRUCTIONS
Cat Bite    A cat bite can cause a wound deep enough to break the skin. In such cases, the wound is cleaned and then sometimes closed. If the wound is closed it is usually not closed completely. This is so that fluid can drain if the wound becomes infected. Often the wound is left open to heal. In addition to wound care, a tetanus shot may be given, if needed.  Home care  · Wash your hands well with soap and warm water before and after caring for the wound. This helps lower the risk of infection.  · Care for the wound as directed. If a dressing was applied to the wound, be sure to change it as directed.  · If the wound bleeds, place a clean, soft cloth on the wound. Then firmly apply pressure until the bleeding stops. This may take up to 5 minutes. Do not release the pressure and look at the wound during this time.  · Always get medical attention for cat bites on the hand. They are highly likely to become infected.  · Most wounds heal within 10 days. But an infection can occur even with proper treatment. So be sure to check the wound daily for signs of infection (see below).  · Antibiotics may be prescribed. These help prevent or treat infection. If youre given antibiotics, take them as directed. Also be sure to complete the medicines.  Rabies prevention  Rabies is a virus that can be carried in certain animals. These can include domestic animals such as cats and dogs. Pets fully vaccinated against rabies (2 shots) are at very low risk of infection. But because human rabies is almost always fatal, any biting pet should be confined for 10 days as an extra precaution. In general, if there is a risk for rabies, the following steps may need to be taken:  · If someones pet cat has bitten you, it should be kept in a secure area for the next 10 days to watch for signs of illness. (If the pet owner wont allow this, contact your local animal control center.) If the cat becomes ill or dies during that time, contact your  local animal control center at once so the animal may be tested for rabies. If the cat stays healthy for the next 10 days, there is no danger of rabies in the animal or you.  · If a stray cat bit you, contact your local animal control center. They can give information on capture, quarantine, and animal rabies testing.  · If you cant find the animal that bit you in the next 2 days, and if rabies exists in your area, you may need to receive the rabies vaccine series. Call your healthcare provider right away. Or return to the emergency department promptly.  · All animal bites should be reported to the local animal control center. If you were not given a form to fill out, you can report this yourself.  Follow-up care  Follow up with your healthcare provider, or as directed.  When to seek medical advice  Call your healthcare provider right away if any of these occur:  · Signs of infection:  ¨ Spreading redness or warmth from the wound  ¨ Increased pain or swelling  ¨ Fever of 100.4ºF (38ºC) or higher, or as directed by your healthcare provider  ¨ Colored fluid or pus draining from the wound  ¨ Enlarged lymph nodes above the area that was bitten, such as lymph nodes in the armpit if you were bitten on the hand or arm. This may be a sign of cat-scratch disease (cat-scratch fever).  · Signs of rabies infection:  ¨ Headache  ¨ Confusion  ¨ Strange behavior  ¨ Increased salivating or drooling  ¨ Seizure  · Decreased ability to move any body part near the bite area  · Bleeding that can't be stopped after 5 minutes of firm pressure  Date Last Reviewed: 3/23/2015  © 8105-0880 [employer]. [address] [place] [address], [place] [address]. All rights reserved. This information is not intended as a substitute for professional medical care. Always follow your healthcare professional's instructions.

## 2020-09-08 NOTE — LETTER
[facility] [address], [address]  [place] [address]  Phone: [phone]  Fax: [phone]  [facility]: [id number]    Pt Name: Allyssa Leong  Injury Date: 09/08/2020   Employee ID:  Date of First Treatment: 09/08/2020   Company:  [employer]      Appointment Time:  Arrived: 3:56 PM   Provider: Ceasra El PA-C Time Out: 5:04 PM     Office Treatment:   1. Cat bite, initial encounter      Medications Ordered This Encounter   Medications    clindamycin (CLEOCIN) 300 MG capsule    doxycycline (VIBRA-TABS) 100 MG tablet    ibuprofen (ADVIL,MOTRIN) 800 MG tablet           Restrictions: Regular Duty     Return Appointment: 9/10/2020 at 9:30 AM       KD

## 2020-09-09 PROCEDURE — 90471 IMMUNIZATION ADMIN: CPT | Mod: S$GLB,,, | Performed by: PREVENTIVE MEDICINE

## 2020-09-09 PROCEDURE — 90714 TD VACCINE GREATER THAN OR EQUAL TO 7YO WITH PRESERVATIVE IM: ICD-10-PCS | Mod: S$GLB,,, | Performed by: PREVENTIVE MEDICINE

## 2020-09-09 PROCEDURE — 90714 TD VACC NO PRESV 7 YRS+ IM: CPT | Mod: S$GLB,,, | Performed by: PREVENTIVE MEDICINE

## 2020-09-09 PROCEDURE — 90471 TD VACCINE GREATER THAN OR EQUAL TO 7YO WITH PRESERVATIVE IM: ICD-10-PCS | Mod: S$GLB,,, | Performed by: PREVENTIVE MEDICINE

## 2020-09-11 ENCOUNTER — OFFICE VISIT (OUTPATIENT)
Dept: URGENT CARE | Facility: CLINIC | Age: 25
End: 2020-09-11
Payer: OTHER MISCELLANEOUS

## 2020-09-15 ENCOUNTER — PATIENT MESSAGE (OUTPATIENT)
Dept: INTERNAL MEDICINE | Facility: CLINIC | Age: 25
End: 2020-09-15

## 2020-09-16 ENCOUNTER — OFFICE VISIT (OUTPATIENT)
Dept: URGENT CARE | Facility: CLINIC | Age: 25
End: 2020-09-16
Payer: OTHER MISCELLANEOUS
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
## 2020-09-16 PROCEDURE — 99214 PR OFFICE/OUTPT VISIT, EST, LEVL IV, 30-39 MIN: ICD-10-PCS | Mod: S$GLB,,, | Performed by: PHYSICIAN ASSISTANT

## 2020-09-16 PROCEDURE — 99214 OFFICE O/P EST MOD 30 MIN: CPT | Mod: S$GLB,,, | Performed by: PHYSICIAN ASSISTANT

## 2020-09-16 NOTE — PROGRESS NOTES
Subjective:       Patient ID: Allyssa Leong is a 25 y.o. female.    Chief Complaint: Animal Bite (cat)    Patient returns today for a recheck for a cat bit on 9/8/20 to her left index finger.No pain no problems. She is taking her medications with no problems. Feels great.-MR    Animal Bite   Pertinent negatives include no abdominal pain, no light-headedness, no loss of consciousness and no weakness.       Constitution: Negative for fatigue.   HENT: Negative for facial swelling and facial trauma.    Neck: Negative for neck stiffness.   Cardiovascular: Negative for chest trauma.   Eyes: Negative for eye trauma, double vision and blurred vision.   Gastrointestinal: Negative for abdominal trauma, abdominal pain and rectal bleeding.   Genitourinary: Negative for hematuria, missed menses, genital trauma and pelvic pain.   Musculoskeletal: Negative for pain, trauma, joint swelling and abnormal ROM of joint.   Skin: Negative for color change, wound, abrasion, laceration and bruising.   Neurological: Negative for dizziness, history of vertigo, light-headedness, coordination disturbances, altered mental status and loss of consciousness.   Hematologic/Lymphatic: Negative for history of bleeding disorder.   Psychiatric/Behavioral: Negative for altered mental status.        Objective:      Physical Exam  Nursing note reviewed.   Constitutional:       General: She is not in acute distress.     Appearance: She is well-developed. She is not diaphoretic.   HENT:      Head: Normocephalic and atraumatic.      Right Ear: Hearing and external ear normal.      Left Ear: Hearing and external ear normal.      Nose: Nose normal. No nasal deformity.   Eyes:      General: Lids are normal. No scleral icterus.     Conjunctiva/sclera: Conjunctivae normal.   Neck:      Musculoskeletal: Normal range of motion.      Trachea: Trachea normal.   Cardiovascular:      Pulses: Normal pulses.   Pulmonary:      Effort: Pulmonary effort is normal. No  respiratory distress.      Breath sounds: No stridor.   Musculoskeletal:      Left hand: She exhibits normal range of motion, no tenderness, normal capillary refill, no deformity and no swelling. Normal sensation noted. Normal strength noted.        Hands:    Skin:     General: Skin is warm and dry.      Capillary Refill: Capillary refill takes less than 2 seconds.   Neurological:      Mental Status: She is alert. She is not disoriented.      GCS: GCS eye subscore is 4. GCS verbal subscore is 5. GCS motor subscore is 6.      Sensory: No sensory deficit.   Psychiatric:         Attention and Perception: She is attentive.         Speech: Speech normal.         Behavior: Behavior normal.         Assessment:       1. Cat bite, subsequent encounter    2. Work related injury        Plan:            Patient Instructions: (Continue antibiotics until completed.)   Restrictions: Regular Duty, Discharged from Occupational Health  Follow up if symptoms worsen or fail to improve.

## 2020-09-16 NOTE — TELEPHONE ENCOUNTER
Continue antibiotics. If ear pain worsens or doesn't improve then needs evaluation in clinic or urgent care.

## 2020-09-16 NOTE — LETTER
[facility] [place]  [address]  [place] [address]  Phone: [phone]  Fax: [phone]  [facility]: [id number]    Pt Name: Allyssa Leong  Injury Date: 09/08/2020   Employee ID: [id number] Date of Treatment: 09/16/2020   Company: [employer]      Appointment Time: 02:15 PM Arrived: 2:26 pm   Provider: Ceasar El PA-C Time Out:2:57 pm     Office Treatment:     1. Cat bite, subsequent encounter    2. Work related injury          Patient Instructions: (Continue antibiotics until completed.)    Restrictions: Regular Duty, Discharged from Occupational Health     Return Appointment:NONE

## 2020-10-11 ENCOUNTER — PATIENT MESSAGE (OUTPATIENT)
Dept: INTERNAL MEDICINE | Facility: CLINIC | Age: 25
End: 2020-10-11

## 2020-10-11 ENCOUNTER — PATIENT MESSAGE (OUTPATIENT)
Dept: ADMINISTRATIVE | Facility: OTHER | Age: 25
End: 2020-10-11

## 2020-10-12 ENCOUNTER — NURSE TRIAGE (OUTPATIENT)
Dept: ADMINISTRATIVE | Facility: CLINIC | Age: 25
End: 2020-10-12

## 2020-10-12 ENCOUNTER — OFFICE VISIT (OUTPATIENT)
Dept: URGENT CARE | Facility: CLINIC | Age: 25
End: 2020-10-12
Payer: COMMERCIAL

## 2020-10-12 VITALS
HEART RATE: 89 BPM | TEMPERATURE: 98 F | DIASTOLIC BLOOD PRESSURE: 74 MMHG | RESPIRATION RATE: 17 BRPM | OXYGEN SATURATION: 97 % | HEIGHT: 65 IN | SYSTOLIC BLOOD PRESSURE: 121 MMHG | BODY MASS INDEX: 29.16 KG/M2 | WEIGHT: 175 LBS

## 2020-10-12 DIAGNOSIS — R50.9 FEVER, UNSPECIFIED FEVER CAUSE: ICD-10-CM

## 2020-10-12 DIAGNOSIS — J01.90 ACUTE NON-RECURRENT SINUSITIS, UNSPECIFIED LOCATION: ICD-10-CM

## 2020-10-12 DIAGNOSIS — R68.83 CHILLS: Primary | ICD-10-CM

## 2020-10-12 LAB
CTP QC/QA: YES
CTP QC/QA: YES
FLUAV AG NPH QL: NEGATIVE
FLUBV AG NPH QL: NEGATIVE
SARS-COV-2 RDRP RESP QL NAA+PROBE: NEGATIVE

## 2020-10-12 PROCEDURE — 87804 POCT INFLUENZA A/B: ICD-10-PCS | Mod: 59,QW,S$GLB, | Performed by: NURSE PRACTITIONER

## 2020-10-12 PROCEDURE — 87804 INFLUENZA ASSAY W/OPTIC: CPT | Mod: QW,S$GLB,, | Performed by: NURSE PRACTITIONER

## 2020-10-12 PROCEDURE — 99214 OFFICE O/P EST MOD 30 MIN: CPT | Mod: 25,S$GLB,, | Performed by: NURSE PRACTITIONER

## 2020-10-12 PROCEDURE — 96372 PR INJECTION,THERAP/PROPH/DIAG2ST, IM OR SUBCUT: ICD-10-PCS | Mod: S$GLB,,, | Performed by: NURSE PRACTITIONER

## 2020-10-12 PROCEDURE — 99214 PR OFFICE/OUTPT VISIT, EST, LEVL IV, 30-39 MIN: ICD-10-PCS | Mod: 25,S$GLB,, | Performed by: NURSE PRACTITIONER

## 2020-10-12 PROCEDURE — 96372 THER/PROPH/DIAG INJ SC/IM: CPT | Mod: S$GLB,,, | Performed by: NURSE PRACTITIONER

## 2020-10-12 PROCEDURE — U0002: ICD-10-PCS | Mod: QW,S$GLB,, | Performed by: NURSE PRACTITIONER

## 2020-10-12 PROCEDURE — U0002 COVID-19 LAB TEST NON-CDC: HCPCS | Mod: QW,S$GLB,, | Performed by: NURSE PRACTITIONER

## 2020-10-12 RX ORDER — BENZONATATE 200 MG/1
200 CAPSULE ORAL 3 TIMES DAILY PRN
Qty: 20 CAPSULE | Refills: 0 | Status: SHIPPED | OUTPATIENT
Start: 2020-10-12 | End: 2020-10-22

## 2020-10-12 RX ORDER — BETAMETHASONE SODIUM PHOSPHATE AND BETAMETHASONE ACETATE 3; 3 MG/ML; MG/ML
9 INJECTION, SUSPENSION INTRA-ARTICULAR; INTRALESIONAL; INTRAMUSCULAR; SOFT TISSUE ONCE
Status: COMPLETED | OUTPATIENT
Start: 2020-10-12 | End: 2020-10-12

## 2020-10-12 RX ADMIN — BETAMETHASONE SODIUM PHOSPHATE AND BETAMETHASONE ACETATE 9 MG: 3; 3 INJECTION, SUSPENSION INTRA-ARTICULAR; INTRALESIONAL; INTRAMUSCULAR; SOFT TISSUE at 10:10

## 2020-10-12 NOTE — TELEPHONE ENCOUNTER
Reason for Disposition   [1] COVID-19 infection diagnosed or suspected AND [2] mild symptoms (fever, cough) AND [3] no trouble breathing or other complications    Additional Information   Negative: Severe difficulty breathing (e.g., struggling for each breath, speaks in single words)   Negative: Difficult to awaken or acting confused (e.g., disoriented, slurred speech)   Negative: Bluish (or gray) lips or face now   Negative: Shock suspected (e.g., cold/pale/clammy skin, too weak to stand, low BP, rapid pulse)   Negative: Sounds like a life-threatening emergency to the triager   Negative: SEVERE or constant chest pain (Exception: mild central chest pain, present only when coughing)   Negative: MODERATE difficulty breathing (e.g., speaks in phrases, SOB even at rest, pulse 100-120)   Negative: Patient sounds very sick or weak to the triager   Negative: MILD difficulty breathing (e.g., minimal/no SOB at rest, SOB with walking, pulse <100)   Negative: Chest pain   Negative: Fever > 103 F (39.4 C)   Negative: [1] Fever > 101 F (38.3 C) AND [2] age > 60   Negative: [1] Fever > 100.0 F (37.8 C) AND [2] bedridden (e.g., nursing home patient, CVA, chronic illness, recovering from surgery)   Negative: HIGH RISK patient (e.g., age > 64 years, diabetes, heart or lung disease, weak immune system)   Negative: Fever present > 3 days (72 hours)   Negative: [1] Fever returns after gone for over 24 hours AND [2] symptoms worse or not improved   Negative: [1] Continuous (nonstop) coughing interferes with work or school AND [2] no improvement using cough treatment per protocol   Negative: Cough present > 3 weeks    Protocols used: CORONAVIRUS (COVID-19) - DIAGNOSED OR [id number]        Low grade temp 99.6 oral. Has sinus post nasal drip chills, and body aches, and occasional cough. Asked about testing and referred to her local urgent care center.

## 2020-10-12 NOTE — PROGRESS NOTES
"Subjective:       Patient ID: Allyssa Leong is a 25 y.o. female.    Vitals:  height is 5' 5" (1.651 m) and weight is 79.4 kg (175 lb). Her temperature is 97.8 °F (36.6 °C). Her blood pressure is 121/74 and her pulse is 89. Her respiration is 17 and oxygen saturation is 97%.     Chief Complaint: Chills    Pt states chills, body aches, and post nasal drip x 4 days.    Other  This is a new problem. The current episode started in the past 7 days. The problem occurs intermittently. The problem has been unchanged. Associated symptoms include chills and myalgias. Pertinent negatives include no arthralgias, chest pain, congestion, coughing, fatigue, fever, headaches, joint swelling, nausea, rash, sore throat, vertigo, vomiting or weakness. Nothing aggravates the symptoms. She has tried acetaminophen and NSAIDs for the symptoms. The treatment provided mild relief.       Constitution: Positive for chills. Negative for fatigue and fever.   HENT: Positive for postnasal drip. Negative for congestion and sore throat.    Neck: Negative for painful lymph nodes.   Cardiovascular: Negative for chest pain and leg swelling.   Eyes: Negative for double vision and blurred vision.   Respiratory: Negative for cough and shortness of breath.    Gastrointestinal: Negative for nausea, vomiting and diarrhea.   Genitourinary: Negative for dysuria, frequency, urgency and history of kidney stones.   Musculoskeletal: Positive for muscle ache. Negative for joint pain, joint swelling and muscle cramps.   Skin: Negative for color change, pale, rash and bruising.   Allergic/Immunologic: Negative for seasonal allergies.   Neurological: Negative for dizziness, history of vertigo, light-headedness, passing out and headaches.   Hematologic/Lymphatic: Negative for swollen lymph nodes.   Psychiatric/Behavioral: Negative for nervous/anxious, sleep disturbance and depression. The patient is not nervous/anxious.        Objective:      Physical Exam "   Constitutional: She is oriented to person, place, and time. She appears well-developed. She is cooperative.  Non-toxic appearance. She does not appear ill. No distress.   HENT:   Head: Normocephalic and atraumatic.   Ears:   Right Ear: Hearing, tympanic membrane, external ear and ear canal normal.   Left Ear: Hearing, tympanic membrane, external ear and ear canal normal.   Nose: Mucosal edema and rhinorrhea present. No nasal deformity. No epistaxis. Right sinus exhibits no maxillary sinus tenderness and no frontal sinus tenderness. Left sinus exhibits no maxillary sinus tenderness and no frontal sinus tenderness.   Mouth/Throat: Uvula is midline and mucous membranes are normal. No trismus in the jaw. Normal dentition. No uvula swelling. Posterior oropharyngeal edema and posterior oropharyngeal erythema present.   Eyes: Conjunctivae and lids are normal. Right eye exhibits no discharge. Left eye exhibits no discharge. No scleral icterus.   Neck: Trachea normal, normal range of motion, full passive range of motion without pain and phonation normal. Neck supple.   Cardiovascular: Normal rate, regular rhythm, normal heart sounds and normal pulses.   Pulmonary/Chest: Effort normal and breath sounds normal. No respiratory distress.   Abdominal: Soft. Normal appearance and bowel sounds are normal. She exhibits no distension, no pulsatile midline mass and no mass. There is no abdominal tenderness.   Musculoskeletal: Normal range of motion.         General: No deformity.   Neurological: She is alert and oriented to person, place, and time. She exhibits normal muscle tone. Coordination normal.   Skin: Skin is warm, dry, intact, not diaphoretic and not pale. Psychiatric: Her speech is normal and behavior is normal. Judgment and thought content normal.   Nursing note and vitals reviewed.        Assessment:       1. Chills    2. Fever, unspecified fever cause    3. Acute non-recurrent sinusitis, unspecified location         Plan:         Chills  -     POCT COVID-19 Rapid Screening    Fever, unspecified fever cause  -     POCT Influenza A/B    Acute non-recurrent sinusitis, unspecified location    Other orders  -     betamethasone acetate-betamethasone sodium phosphate injection 9 mg  -     benzonatate (TESSALON) 200 MG capsule; Take 1 capsule (200 mg total) by mouth 3 (three) times daily as needed for Cough.  Dispense: 20 capsule; Refill: 0

## 2020-10-12 NOTE — LETTER
October 12, 2020      [facility] [address] JAC QURESHICarmen BAZAN  [facility] [address]  Phone: [phone]  Fax: [phone]       Patient: Allyssa Leong   YOB: 1995  Date of Visit: 10/12/2020    To Whom It May Concern:    Ana Leong  was at [facility] on 10/12/2020. She may return to work/school on 10/14/2020 with no restrictions. If you have any questions or concerns, or if I can be of further assistance, please do not hesitate to contact me.    Sincerely,    Garfield Braun, NP

## 2020-10-12 NOTE — PATIENT INSTRUCTIONS
Urgent Care Management:  - Treatment plan discussed.  - PCP recommendations given.  - Return precautions advised.  - Patient agrees with and understands plan of care.    Patient Instructions, Education, Teaching and Summary of Visit:      RETURN TO CLINIC IF SYMPTOMS WORSEN OR CALL 911 IMMEDIATELY FOR SHORTNESS OF BREATH, CHEST PAIN, DIZZINESS, WORSENING PAIN, NAUSEA AND VOMITING, HEART PALPITATIONS, FEVER AND/OR NECK STIFFNESS. FOLLOW UP WITH PRIMARY CARE PROVIDER IN THE AM.    -Diagnosis and treatment plan discussed with patient.  -Patient agreed with my treatment plan.  -Patient will follow up with primary care provider or Specialty Provider, as discussed.     -If you were prescribed a narcotic or controlled medication, do not drive or operate heavy equipment or machinery while taking these medications.  -You must understand that you've received an Urgent Care treatment only and that you may be released before all your medical problems are known or treated.   -You, the patient, will arrange for follow up care as instructed.  -Follow up with your PCP or specialty clinic as directed in the next 1-2 weeks if not improved or as needed.    -You can call [phone] to schedule an appointment with the appropriate provider.  -If your condition worsens we recommend that you receive another evaluation at the emergency room immediately or contact your primary medical clinics after hours call service to discuss your concerns.  -Please return here or go to the Emergency Department for any concerns or worsening of condition.    Please arrange follow up with your primary medical clinic as soon as possible. You must understand that you've received an Urgent Care treatment only and that you may be released before all of your medical problems are known or treated. You, the patient, will arrange for follow up as instructed. If your symptoms worsen or fail to improve you should go to the Emergency Room.    WE CANNOT RULE OUT  ALL POSSIBLE CAUSES OF YOUR SYMPTOMS IN THE URGENT CARE SETTING PLEASE GO TO THE ER IF YOU FEELS YOUR CONDITION IS WORSENING OR YOU WOULD LIKE EMERGENT EVALUATION.     Please return here or go to the Emergency Department for any concerns or worsening of condition.  If you were prescribed antibiotics, please take them to completion.  If you were prescribed a narcotic medication, do not drive or operate heavy equipment or machinery while taking these medications.  Please follow up with your primary care doctor or specialist as needed.     If you  smoke, please stop smoking.  Sinusitis (No Antibiotics)    The sinuses are air-filled spaces within the bones of the face. They connect to the inside of the nose. Sinusitis is an inflammation of the tissue lining the sinus cavity. Sinus inflammation can occur during a cold. It can also be due to allergies to pollens and other particles in the air. It can cause symptoms such as sinus congestion, headache, sore throat, facial swelling and fullness. It may also cause a low-grade fever. No infection is present, and no antibiotic treatment is needed.  Home care  · Drink plenty of water, hot tea, and other liquids. This may help thin mucus. It also may promote sinus drainage.  · Heat may help soothe painful areas of the face. Use a towel soaked in hot water. Or,  the shower and direct the hot spray onto your face. Using a vaporizer along with a menthol rub at night may also help.   · An expectorant containing guaifenesin may help thin the mucus and promote drainage from the sinuses.  · Over-the-counter decongestants may be used unless a similar medicine was prescribed. Nasal sprays work the fastest. Use one that contains phenylephrine or oxymetazoline. First blow the nose gently. Then use the spray. Do not use these medicines more often than directed on the label or symptoms may get worse. You may also use tablets containing pseudoephedrine. Avoid products that combine  ingredients, because side effects may be increased. Read labels. You can also ask the pharmacist for help. (NOTE: Persons with high blood pressure should not use decongestants. They can raise blood pressure.)  · Over-the-counter antihistamines may help if allergies contributed to your sinusitis.    · Use acetaminophen or ibuprofen to control pain, unless another pain medicine was prescribed. (If you have chronic liver or kidney disease or ever had a stomach ulcer, talk with your doctor before using these medicines. Aspirin should never be used in anyone under 18 years of age who is ill with a fever. It may cause severe liver damage.)  · Use nasal rinses or irrigation as instructed by your health care provider.  · Don't smoke. This can worsen symptoms.  Follow-up care  Follow up with your healthcare provider or our staff if you are not improving within the next week.  When to seek medical advice  Call your healthcare provider if any of these occur:  · Green or yellow discharge from the nose or into the throat  · Facial pain or headache becoming more severe  · Stiff neck  · Unusual drowsiness or confusion  · Swelling of the forehead or eyelids  · Vision problems, including blurred or double vision  · Fever of 100.4ºF (38ºC) or higher, or as directed by your healthcare provider  · Seizure  · Breathing problems  · Symptoms not resolving within 10 days  Date Last Reviewed: 4/13/2015  © 0546-9914 [employer] [address] [place] [address], [place] [address]. All rights reserved. This information is not intended as a substitute for professional medical care. Always follow your healthcare professional's instructions.

## 2020-12-07 ENCOUNTER — OFFICE VISIT (OUTPATIENT)
Dept: INTERNAL MEDICINE | Facility: CLINIC | Age: 25
End: 2020-12-07
Payer: COMMERCIAL

## 2020-12-07 ENCOUNTER — TELEPHONE (OUTPATIENT)
Dept: INTERNAL MEDICINE | Facility: CLINIC | Age: 25
End: 2020-12-07

## 2020-12-07 ENCOUNTER — PATIENT MESSAGE (OUTPATIENT)
Dept: INTERNAL MEDICINE | Facility: CLINIC | Age: 25
End: 2020-12-07

## 2020-12-07 ENCOUNTER — PATIENT MESSAGE (OUTPATIENT)
Dept: ADMINISTRATIVE | Facility: OTHER | Age: 25
End: 2020-12-07

## 2020-12-07 ENCOUNTER — LAB VISIT (OUTPATIENT)
Dept: INTERNAL MEDICINE | Facility: CLINIC | Age: 25
End: 2020-12-07
Payer: COMMERCIAL

## 2020-12-07 DIAGNOSIS — Z20.822 SUSPECTED COVID-19 VIRUS INFECTION: Primary | ICD-10-CM

## 2020-12-07 DIAGNOSIS — Z20.822 SUSPECTED COVID-19 VIRUS INFECTION: ICD-10-CM

## 2020-12-07 PROCEDURE — U0003 INFECTIOUS AGENT DETECTION BY NUCLEIC ACID (DNA OR RNA); SEVERE ACUTE RESPIRATORY SYNDROME CORONAVIRUS 2 (SARS-COV-2) (CORONAVIRUS DISEASE [COVID-19]), AMPLIFIED PROBE TECHNIQUE, MAKING USE OF HIGH THROUGHPUT TECHNOLOGIES AS DESCRIBED BY CMS-2020-01-R: HCPCS

## 2020-12-07 PROCEDURE — 99213 OFFICE O/P EST LOW 20 MIN: CPT | Mod: 95,,, | Performed by: INTERNAL MEDICINE

## 2020-12-07 PROCEDURE — 99213 PR OFFICE/OUTPT VISIT, EST, LEVL III, 20-29 MIN: ICD-10-PCS | Mod: 95,,, | Performed by: INTERNAL MEDICINE

## 2020-12-07 NOTE — PATIENT INSTRUCTIONS
Instructions for Patients with Confirmed or Suspected COVID-19    If you are awaiting your test result, you will either be called or it will be released to the patient portal.  If you have any questions about your test, please visit [email or web address] or call our COVID-19 information line at [phone].      Instructions for non-hospitalized or discharged patients with confirmed or suspected COVID-19:       Stay home except to get medical care.    Separate yourself from other people and animals in your home.    Call ahead before visiting your doctor.    Wear a face mask.    Cover your coughs and sneezes.    Clean your hands often.    Avoid sharing personal household items.    Clean all high-touch surfaces every day.    Monitor your symptoms. Seek prompt medical attention if your illness is worsening (e.g., difficulty breathing). Before seeking care, call your healthcare provider.    If you have a medical emergency and must call 911, notify the dispatcher that you have or are being evaluated for COVID-19. If possible, put on a face mask before emergency medical services arrive.    Use the following symptom-based strategy to return to normal activity following a suspected or confirmed case of COVID-19. Continue isolation until:   o At least 3 days (72 hours) have passed since recovery defined as resolution of fever without the use of fever-reducing medications and improvement in respiratory symptoms (e.g. cough, shortness of breath), and   o At least 10 days have passed since the first positive test.       As one of the next steps, you will receive a call or text from the [place] Department of Health ([facility]) COVID-19 Tracing Team. See the contact information below so you know not to ignore the health departments call. It is important that you contact them back immediately so they can help.     Contact Tracer Number:  [phone]  Caller ID for most carriers: [place] [facility]    What is  contact tracing?   Contact tracing is a process that helps identify everyone who has been in close contact with an infected person. Contact tracers let those people know they may have been exposed and guide them on next steps. Confidentiality is important for everyone; no one will be told who may have exposed them to the virus.   Your involvement is important. The more we know about where and how this virus is spreading, the better chance we have at stopping it from spreading further.  What does exposure mean?   Exposure means you have been within 6 feet for more than 15 minutes with a person who has or had COVID-19.  What kind of questions do the contact tracers ask?   A contact tracer will confirm your basic contact information including name, address, phone number, and next of kin, as well as asking about any symptoms you may have had. Theyll also ask you how you think you may have gotten sick, such as places where you may have been exposed to the virus, and people you were with. Those names will never be shared with anyone outside of that call, and will only be used to help trace and stop the spread of the virus.   I have privacy concerns. How will the state use my information?   Your privacy about your health is important. All calls are completed using call centers that use the appropriate health privacy protection measures (HIPAA compliance), meaning that your patient information is safe. No one will ever ask you any questions related to immigration status. Your health comes first.   Do I have to participate?   You do not have to participate, but we strongly encourage you to. Contact tracing can help us catch and control new outbreaks as theyre developing to keep your friends and family safe.   What if I dont hear from anyone?   If you dont receive a call within 24 hours, you can call the number above right away to inquire about your status. That line is open from 8:00 am - 8:00 p.m., 7 days a  week.  Contact tracing saves lives! Together, we have the power to beat this virus and keep our loved ones and neighbors safe.       Instructions for household members, intimate partners and caregivers in a non-healthcare setting of a patient with confirmed or suspected COVID-19:         Close contacts should monitor their health and call their healthcare provider right away if they develop symptoms suggestive of COVID-19 (e.g., fever, cough, shortness of breath).    Stay home except to get medical care. Separate yourself from other people and animals in the home.   Monitor the patients symptoms. If the patient is getting sicker, call his or her healthcare provider. If the patient has a medical emergency and you need to call 911, notify the dispatch personnel that the patient has or is being evaluated for COVID-19.    Wear a facemask when around other people such as sharing a room or vehicle and before entering a healthcare provider's office.   Cover coughs and sneezes with a tissue. Throw used tissues in a lined trash can immediately and wash hands.   Clean hands often with soap and water for at least 20 seconds or with an alcohol-based hand , rubbing hands together until they feel dry. Avoid touching your eyes, nose, and mouth with unwashed hands.   Clean all high-touch; surfaces every day, including counters, tabletops, doorknobs, bathroom fixtures, toilets, phones, keyboards, tablets, bedside tables, etc. Use a household cleaning spray or wipe according to label instructions.   Avoid sharing personal household items such as dishes, drinking glasses, cups, towels, bedding, etc. After these items are used, they should be washed thoroughly with soap and water.   Continue isolation until:   At least 3 days (72 hours) have passed since recovery defined as resolution of fever without the use of fever-reducing medications and improvement in respiratory symptoms (e.g. cough, shortness of breath),  and    At least 10 days have passed since the patients first positive test.    https://www.cdc.gov/coronavirus/2019-ncov/your-health/index.htm

## 2020-12-07 NOTE — TELEPHONE ENCOUNTER
Spoke to pt and she is having fever and body aches and a slight cough. I scheduled her for a virtual visit this morning

## 2020-12-07 NOTE — PROGRESS NOTES
"Subjective:       Patient ID: Allyssa Leong is a 25 y.o. female.      The patient location is: [place]   The chief complaint leading to consultation is: COVID concerns  Visit type: Virtual visit with synchronous audio and video  Total time spent with patient: 6 minutes  Total time spent on patient encounter: 10 minutes  Each patient to whom he or she provides medical services by telemedicine is:  (1) informed of the relationship between the physician and patient and the respective role of any other health care provider with respect to management of the patient; and (2) notified that he or she may decline to receive medical services by telemedicine and may withdraw from such care at any time.        Chief Complaint: COVID-19 Concerns    HPI      Notes:   She reports viral symptoms started yesterday. 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 She took her temperature after work yesterday evening and had temp of 101F. She took motrin with resolution of fever. She endorses fatigue, nonproductive cough, chest tightness/sore throat. No loss of taste or smell.  A few of her coworkers are positive for COVID.      Review of Systems   Constitutional: Positive for appetite change, fatigue and fever.   HENT: Positive for sore throat.    Respiratory: Positive for cough and chest tightness. Negative for shortness of breath.    Cardiovascular: Negative for chest pain.   Gastrointestinal: Negative for diarrhea, nausea and vomiting.   Musculoskeletal: Positive for arthralgias and myalgias.   Neurological: Positive for headaches.       Objective:        There were no vitals filed for this visit.    Physical Exam  Constitutional:       General: She is not in acute distress.     Appearance: She is well-developed. She is not diaphoretic.   HENT:      Head: Normocephalic and atraumatic.      Right Ear: External ear normal.      Left Ear: External ear normal.   Eyes:    "   Conjunctiva/sclera: Conjunctivae normal.   Pulmonary:      Effort: Pulmonary effort is normal. No respiratory distress.   Neurological:      Mental Status: She is alert.   Psychiatric:         Behavior: Behavior normal.         Assessment:     1. Suspected COVID-19 virus infection           Plan:         1. Suspected COVID-19 virus infection  - work excuse provided  - discussed symptomatic treatment and rtw guidelines  - monitor temperature and symptoms for now  Isolation precautions, wash hands, clean surfaces, monitor symptoms- ER for new or worsening symptoms. Info provided on AVS.   - COVID-19 Routine Screening; Future           Patient note was created using M[employer] Dictation.  Any errors in syntax or even information may not have been identified and edited on initial review prior to signing this note.

## 2020-12-07 NOTE — TELEPHONE ENCOUNTER
----- Message from Lucretia Hussein MD sent at 12/7/2020  9:55 AM CST -----  Please contact pt to schedule covid test

## 2020-12-09 ENCOUNTER — TELEPHONE (OUTPATIENT)
Dept: INTERNAL MEDICINE | Facility: CLINIC | Age: 25
End: 2020-12-09

## 2020-12-09 LAB — SARS-COV-2 RNA RESP QL NAA+PROBE: NOT DETECTED

## 2020-12-09 NOTE — TELEPHONE ENCOUNTER
----- Message from Lucretia Hussein MD sent at 12/9/2020  5:49 AM CST -----  Results released to patient portal.

## 2021-03-25 ENCOUNTER — OFFICE VISIT (OUTPATIENT)
Dept: INTERNAL MEDICINE | Facility: CLINIC | Age: 26
End: 2021-03-25
Payer: COMMERCIAL

## 2021-03-25 DIAGNOSIS — Z83.49 FAMILY HISTORY OF THYROID PROBLEM: ICD-10-CM

## 2021-03-25 DIAGNOSIS — E66.9 CLASS 1 OBESITY WITHOUT SERIOUS COMORBIDITY WITH BODY MASS INDEX (BMI) OF 30.0 TO 30.9 IN ADULT, UNSPECIFIED OBESITY TYPE: Primary | ICD-10-CM

## 2021-03-25 PROCEDURE — 99213 OFFICE O/P EST LOW 20 MIN: CPT | Mod: 95,,, | Performed by: INTERNAL MEDICINE

## 2021-03-25 PROCEDURE — 99213 PR OFFICE/OUTPT VISIT, EST, LEVL III, 20-29 MIN: ICD-10-PCS | Mod: 95,,, | Performed by: INTERNAL MEDICINE

## 2021-03-25 RX ORDER — BUPROPION HYDROCHLORIDE 150 MG/1
150 TABLET ORAL DAILY
Qty: 30 TABLET | Refills: 11 | Status: SHIPPED | OUTPATIENT
Start: 2021-03-25 | End: 2021-06-29

## 2021-04-11 ENCOUNTER — PATIENT MESSAGE (OUTPATIENT)
Dept: INTERNAL MEDICINE | Facility: CLINIC | Age: 26
End: 2021-04-11

## 2021-04-11 DIAGNOSIS — Z23 NEED FOR PROPHYLACTIC VACCINATION AGAINST RABIES: Primary | ICD-10-CM

## 2021-04-12 ENCOUNTER — PATIENT MESSAGE (OUTPATIENT)
Dept: INTERNAL MEDICINE | Facility: CLINIC | Age: 26
End: 2021-04-12

## 2021-04-26 ENCOUNTER — PATIENT MESSAGE (OUTPATIENT)
Dept: RESEARCH | Facility: HOSPITAL | Age: 26
End: 2021-04-26

## 2021-05-02 ENCOUNTER — OFFICE VISIT (OUTPATIENT)
Dept: URGENT CARE | Facility: CLINIC | Age: 26
End: 2021-05-02
Payer: COMMERCIAL

## 2021-05-02 VITALS
OXYGEN SATURATION: 98 % | HEIGHT: 65 IN | RESPIRATION RATE: 19 BRPM | TEMPERATURE: 98 F | SYSTOLIC BLOOD PRESSURE: 116 MMHG | HEART RATE: 79 BPM | BODY MASS INDEX: 28.32 KG/M2 | WEIGHT: 170 LBS | DIASTOLIC BLOOD PRESSURE: 78 MMHG

## 2021-05-02 DIAGNOSIS — S61.259A OPEN WOUND OF FINGER OF RIGHT HAND DUE TO DOG BITE: Primary | ICD-10-CM
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
## 2021-05-02 PROCEDURE — 99213 PR OFFICE/OUTPT VISIT, EST, LEVL III, 20-29 MIN: ICD-10-PCS | Mod: S$GLB,,, | Performed by: NURSE PRACTITIONER

## 2021-05-02 PROCEDURE — 99213 OFFICE O/P EST LOW 20 MIN: CPT | Mod: S$GLB,,, | Performed by: NURSE PRACTITIONER

## 2021-05-02 RX ORDER — MUPIROCIN 20 MG/G
OINTMENT TOPICAL 3 TIMES DAILY
Qty: 2 G | Refills: 0 | Status: SHIPPED | OUTPATIENT
Start: 2021-05-02 | End: 2022-04-20

## 2021-05-02 RX ORDER — SULFAMETHOXAZOLE AND TRIMETHOPRIM 800; 160 MG/1; MG/1
1 TABLET ORAL 2 TIMES DAILY
Qty: 14 TABLET | Refills: 0 | Status: SHIPPED | OUTPATIENT
Start: 2021-05-02 | End: 2021-05-09

## 2021-05-02 RX ORDER — CLINDAMYCIN HYDROCHLORIDE 300 MG/1
300 CAPSULE ORAL EVERY 8 HOURS
Qty: 21 CAPSULE | Refills: 0 | Status: SHIPPED | OUTPATIENT
Start: 2021-05-02 | End: 2021-05-09

## 2021-06-11 ENCOUNTER — PATIENT MESSAGE (OUTPATIENT)
Dept: INTERNAL MEDICINE | Facility: CLINIC | Age: 26
End: 2021-06-11

## 2021-06-15 ENCOUNTER — TELEPHONE (OUTPATIENT)
Dept: INFECTIOUS DISEASES | Facility: CLINIC | Age: 26
End: 2021-06-15

## 2021-06-16 ENCOUNTER — CLINICAL SUPPORT (OUTPATIENT)
Dept: INFECTIOUS DISEASES | Facility: CLINIC | Age: 26
End: 2021-06-16

## 2021-06-16 PROCEDURE — 90675 RABIES VACCINE IM: CPT | Mod: JG,S$GLB,, | Performed by: INTERNAL MEDICINE

## 2021-06-16 PROCEDURE — 90675 RABIES VACCINE IM: ICD-10-PCS | Mod: JG,S$GLB,, | Performed by: INTERNAL MEDICINE

## 2021-06-16 PROCEDURE — 90471 RABIES VACCINE IM: ICD-10-PCS | Mod: S$GLB,,, | Performed by: INTERNAL MEDICINE

## 2021-06-16 PROCEDURE — 90471 IMMUNIZATION ADMIN: CPT | Mod: S$GLB,,, | Performed by: INTERNAL MEDICINE

## 2021-06-23 ENCOUNTER — CLINICAL SUPPORT (OUTPATIENT)
Dept: INFECTIOUS DISEASES | Facility: CLINIC | Age: 26
End: 2021-06-23

## 2021-06-23 DIAGNOSIS — Z23 NEED FOR PROPHYLACTIC VACCINATION AGAINST RABIES: ICD-10-CM

## 2021-06-23 PROCEDURE — 90471 IMMUNIZATION ADMIN: CPT | Mod: S$GLB,,, | Performed by: INTERNAL MEDICINE

## 2021-06-23 PROCEDURE — 90675 RABIES VACCINE IM: CPT | Mod: JG,S$GLB,, | Performed by: INTERNAL MEDICINE

## 2021-06-23 PROCEDURE — 90471 RABIES VACCINE IM: ICD-10-PCS | Mod: S$GLB,,, | Performed by: INTERNAL MEDICINE

## 2021-06-23 PROCEDURE — 90675 RABIES VACCINE IM: ICD-10-PCS | Mod: JG,S$GLB,, | Performed by: INTERNAL MEDICINE

## 2021-07-07 ENCOUNTER — CLINICAL SUPPORT (OUTPATIENT)
Dept: INFECTIOUS DISEASES | Facility: CLINIC | Age: 26
End: 2021-07-07

## 2021-07-07 DIAGNOSIS — Z23 NEED FOR PROPHYLACTIC VACCINATION AGAINST RABIES: ICD-10-CM

## 2021-07-07 PROCEDURE — 90471 RABIES VACCINE IM: ICD-10-PCS | Mod: S$GLB,,, | Performed by: INTERNAL MEDICINE

## 2021-07-07 PROCEDURE — 90675 RABIES VACCINE IM: CPT | Mod: JG,S$GLB,, | Performed by: INTERNAL MEDICINE

## 2021-07-07 PROCEDURE — 90471 IMMUNIZATION ADMIN: CPT | Mod: S$GLB,,, | Performed by: INTERNAL MEDICINE

## 2021-07-07 PROCEDURE — 90675 RABIES VACCINE IM: ICD-10-PCS | Mod: JG,S$GLB,, | Performed by: INTERNAL MEDICINE

## 2021-10-25 ENCOUNTER — OFFICE VISIT (OUTPATIENT)
Dept: INTERNAL MEDICINE | Facility: CLINIC | Age: 26
End: 2021-10-25
Payer: COMMERCIAL

## 2021-10-25 DIAGNOSIS — R41.840 DIFFICULTY CONCENTRATING: Primary | ICD-10-CM

## 2021-10-25 DIAGNOSIS — Z81.8: ICD-10-CM

## 2021-10-25 DIAGNOSIS — E66.9 CLASS 1 OBESITY WITHOUT SERIOUS COMORBIDITY WITH BODY MASS INDEX (BMI) OF 30.0 TO 30.9 IN ADULT, UNSPECIFIED OBESITY TYPE: ICD-10-CM

## 2021-10-25 DIAGNOSIS — Z23 COVID-19 VACCINE SERIES STARTED: ICD-10-CM

## 2021-10-25 PROCEDURE — 1160F PR REVIEW ALL MEDS BY PRESCRIBER/CLIN PHARMACIST DOCUMENTED: ICD-10-PCS | Mod: CPTII,95,, | Performed by: INTERNAL MEDICINE

## 2021-10-25 PROCEDURE — 1159F PR MEDICATION LIST DOCUMENTED IN MEDICAL RECORD: ICD-10-PCS | Mod: CPTII,95,, | Performed by: INTERNAL MEDICINE

## 2021-10-25 PROCEDURE — 1159F MED LIST DOCD IN RCRD: CPT | Mod: CPTII,95,, | Performed by: INTERNAL MEDICINE

## 2021-10-25 PROCEDURE — 99213 OFFICE O/P EST LOW 20 MIN: CPT | Mod: 95,,, | Performed by: INTERNAL MEDICINE

## 2021-10-25 PROCEDURE — 99213 PR OFFICE/OUTPT VISIT, EST, LEVL III, 20-29 MIN: ICD-10-PCS | Mod: 95,,, | Performed by: INTERNAL MEDICINE

## 2021-10-25 PROCEDURE — 1160F RVW MEDS BY RX/DR IN RCRD: CPT | Mod: CPTII,95,, | Performed by: INTERNAL MEDICINE

## 2021-11-30 ENCOUNTER — PATIENT MESSAGE (OUTPATIENT)
Dept: INTERNAL MEDICINE | Facility: CLINIC | Age: 26
End: 2021-11-30
Payer: COMMERCIAL

## 2021-12-01 ENCOUNTER — PATIENT MESSAGE (OUTPATIENT)
Dept: INTERNAL MEDICINE | Facility: CLINIC | Age: 26
End: 2021-12-01
Payer: COMMERCIAL

## 2021-12-03 ENCOUNTER — PATIENT MESSAGE (OUTPATIENT)
Dept: INTERNAL MEDICINE | Facility: CLINIC | Age: 26
End: 2021-12-03
Payer: COMMERCIAL

## 2022-01-18 ENCOUNTER — PATIENT MESSAGE (OUTPATIENT)
Dept: ADMINISTRATIVE | Facility: HOSPITAL | Age: 27
End: 2022-01-18
Payer: COMMERCIAL

## 2022-03-16 ENCOUNTER — PATIENT MESSAGE (OUTPATIENT)
Dept: INTERNAL MEDICINE | Facility: CLINIC | Age: 27
End: 2022-03-16
Payer: COMMERCIAL

## 2022-03-16 NOTE — TELEPHONE ENCOUNTER
Should pt wait to see Dr Hussein for this new Rx for ADHD or can I schedule her with any provider?  Please advise

## 2022-03-16 NOTE — TELEPHONE ENCOUNTER
You can schedule her w/ someone who prescribes but they will need the evaluation in hand to prescribe

## 2022-03-17 ENCOUNTER — PATIENT MESSAGE (OUTPATIENT)
Dept: INTERNAL MEDICINE | Facility: CLINIC | Age: 27
End: 2022-03-17
Payer: COMMERCIAL

## 2022-03-23 ENCOUNTER — PATIENT MESSAGE (OUTPATIENT)
Dept: INTERNAL MEDICINE | Facility: CLINIC | Age: 27
End: 2022-03-23

## 2022-03-23 ENCOUNTER — OFFICE VISIT (OUTPATIENT)
Dept: INTERNAL MEDICINE | Facility: CLINIC | Age: 27
End: 2022-03-23
Payer: COMMERCIAL

## 2022-03-23 VITALS
BODY MASS INDEX: 34.32 KG/M2 | SYSTOLIC BLOOD PRESSURE: 110 MMHG | TEMPERATURE: 98 F | OXYGEN SATURATION: 97 % | DIASTOLIC BLOOD PRESSURE: 70 MMHG | HEIGHT: 65 IN | HEART RATE: 104 BPM | WEIGHT: 206 LBS

## 2022-03-23 DIAGNOSIS — F90.0 ATTENTION DEFICIT HYPERACTIVITY DISORDER (ADHD), PREDOMINANTLY INATTENTIVE TYPE: Primary | ICD-10-CM

## 2022-03-23 PROBLEM — N80.9 ENDOMETRIOSIS DETERMINED BY LAPAROSCOPY: Status: ACTIVE | Noted: 2020-06-24

## 2022-03-23 PROBLEM — F41.8 DEPRESSION WITH ANXIETY: Status: ACTIVE | Noted: 2020-06-24

## 2022-03-23 PROCEDURE — 99213 OFFICE O/P EST LOW 20 MIN: CPT | Mod: S$GLB,,, | Performed by: FAMILY MEDICINE

## 2022-03-23 PROCEDURE — 99999 PR PBB SHADOW E&M-EST. PATIENT-LVL III: ICD-10-PCS | Mod: PBBFAC,,, | Performed by: FAMILY MEDICINE

## 2022-03-23 PROCEDURE — 1160F PR REVIEW ALL MEDS BY PRESCRIBER/CLIN PHARMACIST DOCUMENTED: ICD-10-PCS | Mod: CPTII,S$GLB,, | Performed by: FAMILY MEDICINE

## 2022-03-23 PROCEDURE — 1160F RVW MEDS BY RX/DR IN RCRD: CPT | Mod: CPTII,S$GLB,, | Performed by: FAMILY MEDICINE

## 2022-03-23 PROCEDURE — 3008F BODY MASS INDEX DOCD: CPT | Mod: CPTII,S$GLB,, | Performed by: FAMILY MEDICINE

## 2022-03-23 PROCEDURE — 99213 PR OFFICE/OUTPT VISIT, EST, LEVL III, 20-29 MIN: ICD-10-PCS | Mod: S$GLB,,, | Performed by: FAMILY MEDICINE

## 2022-03-23 PROCEDURE — 1159F PR MEDICATION LIST DOCUMENTED IN MEDICAL RECORD: ICD-10-PCS | Mod: CPTII,S$GLB,, | Performed by: FAMILY MEDICINE

## 2022-03-23 PROCEDURE — 1159F MED LIST DOCD IN RCRD: CPT | Mod: CPTII,S$GLB,, | Performed by: FAMILY MEDICINE

## 2022-03-23 PROCEDURE — 99999 PR PBB SHADOW E&M-EST. PATIENT-LVL III: CPT | Mod: PBBFAC,,, | Performed by: FAMILY MEDICINE

## 2022-03-23 PROCEDURE — 3074F PR MOST RECENT SYSTOLIC BLOOD PRESSURE < 130 MM HG: ICD-10-PCS | Mod: CPTII,S$GLB,, | Performed by: FAMILY MEDICINE

## 2022-03-23 PROCEDURE — 3078F PR MOST RECENT DIASTOLIC BLOOD PRESSURE < 80 MM HG: ICD-10-PCS | Mod: CPTII,S$GLB,, | Performed by: FAMILY MEDICINE

## 2022-03-23 PROCEDURE — 3078F DIAST BP <80 MM HG: CPT | Mod: CPTII,S$GLB,, | Performed by: FAMILY MEDICINE

## 2022-03-23 PROCEDURE — 3008F PR BODY MASS INDEX (BMI) DOCUMENTED: ICD-10-PCS | Mod: CPTII,S$GLB,, | Performed by: FAMILY MEDICINE

## 2022-03-23 PROCEDURE — 3074F SYST BP LT 130 MM HG: CPT | Mod: CPTII,S$GLB,, | Performed by: FAMILY MEDICINE

## 2022-03-23 RX ORDER — METHYLPHENIDATE HYDROCHLORIDE 18 MG/1
18 TABLET ORAL EVERY MORNING
Qty: 30 TABLET | Refills: 0 | Status: SHIPPED | OUTPATIENT
Start: 2022-03-23 | End: 2022-03-23 | Stop reason: SDUPTHER

## 2022-03-23 RX ORDER — METHYLPHENIDATE HYDROCHLORIDE 18 MG/1
18 TABLET ORAL EVERY MORNING
Qty: 30 TABLET | Refills: 0 | Status: SHIPPED | OUTPATIENT
Start: 2022-03-23 | End: 2022-04-20

## 2022-03-23 NOTE — TELEPHONE ENCOUNTER
No new care gaps identified.  Powered by [employer]. Reference number: [id number].   3/23/2022 3:15:39 PM CDT

## 2022-03-23 NOTE — PROGRESS NOTES
Allyssa Leong  03/23/2022  [id number]    Lucretia Hussein MD  Patient Care Team:  Lucretia Hussein MD as PCP - General (Internal Medicine)  Adalgisa Villatoro LPN as Care Coordinator  Has the patient seen any provider outside of the [facility] network since the last visit? (no). If yes, HIPPA forms completed and records requested.        Visit Type:New patient    Chief Complaint:  Chief Complaint   Patient presents with    ADHD       History of Present Illness:  26 year old    Patient of Dr. Hussein in [facility]  Here in [place] for School.    Wanted to review an eval for ADD/ADHD and to discuss starting meds.    Overdue for check up.  Was scheduled in [facility]< but since in school did not do visit.    Saw Latoya Bentley.  Eval done 3/16  Enough evidence for ADHD.       History:  Past Medical History:   Diagnosis Date    Depression with anxiety 6/24/2020    Endometriosis      History reviewed. No pertinent surgical history.  Family History   Problem Relation Age of Onset    Breast cancer Mother     Cancer Father         Kaposi Sarcoma    Autism Brother     ADD / ADHD Brother      Social History     Socioeconomic History    Marital status: Single   Tobacco Use    Smoking status: Never Smoker    Smokeless tobacco: Never Used   Substance and Sexual Activity    Alcohol use: No     Social Determinants of Health     Financial Resource Strain: Low Risk     Difficulty of Paying Living Expenses: Not hard at all   Food Insecurity: No Food Insecurity    Worried About Running Out of Food in the Last Year: Never true    Ran Out of Food in the Last Year: Never true   Transportation Needs: No Transportation Needs    Lack of Transportation (Medical): No    Lack of Transportation (Non-Medical): No   Physical Activity: Insufficiently Active    Days of Exercise per Week: 2 days    Minutes of Exercise per Session: 30 min   Stress: Stress Concern Present    Feeling of Stress : Rather much   Social Connections: Unknown     Frequency of Communication with Friends and Family: More than three times a week    Frequency of Social Gatherings with Friends and Family: Three times a week    Active Member of Clubs or Organizations: Yes    Attends Club or Organization Meetings: 1 to 4 times per year    Marital Status: Never    Housing Stability: Low Risk     Unable to Pay for Housing in the Last Year: No    Number of Places Lived in the Last Year: 2    Unstable Housing in the Last Year: No     Patient Active Problem List   Diagnosis    Cyst of breast, left, solitary    Dysmenorrhea    Dyspareunia, female    Encounter for gynecological examination without abnormal finding    Menorrhagia with regular cycle    Pelvic pain in female    Vaginospasm    Vitamin D insufficiency    Depression with anxiety    Endometriosis determined by laparoscopy     Review of patient's allergies indicates:   Allergen Reactions    Penicillins Other (See Comments)     unknown       The following were reviewed at this visit: active problem list, medication list, allergies, family history, social history, and health maintenance.    Medications:  Current Outpatient Medications on File Prior to Visit   Medication Sig Dispense Refill    ergocalciferol (ERGOCALCIFEROL) 50,000 unit Cap TAKE 1 CAPSULE BY MOUTH EVERY 7 DAYS FOR 12 DOSES (Patient not taking: Reported on 3/23/2022) 12 capsule 0    mupirocin (BACTROBAN) 2 % ointment Apply topically 3 (three) times daily. (Patient not taking: Reported on 3/23/2022) 2 g 0    [DISCONTINUED] buPROPion (WELLBUTRIN XL) 150 MG TB24 tablet TAKE 1 TABLET(150 MG) BY MOUTH EVERY DAY (Patient not taking: Reported on 3/23/2022) 30 tablet 11    [DISCONTINUED] ORILISSA 200 mg Tab TK 1 T PO BID       No current facility-administered medications on file prior to visit.       Medications have been reviewed and reconciled with patient at this visit.  Barriers to medications reviewed with patient.    Adverse reactions to  current medications reviewed with patient..    Over the counter medications reviewed and reconciled with patient.    Exam:  Wt Readings from Last 3 Encounters:   03/23/22 93.5 kg (206 lb 0.3 oz)   05/02/21 77.1 kg (170 lb)   10/12/20 79.4 kg (175 lb)     Temp Readings from Last 3 Encounters:   03/23/22 97.7 °F (36.5 °C) (Tympanic)   05/02/21 98.3 °F (36.8 °C) (Oral)   10/12/20 97.8 °F (36.6 °C)     BP Readings from Last 3 Encounters:   03/23/22 110/70   05/02/21 116/78   10/12/20 121/74     Pulse Readings from Last 3 Encounters:   03/23/22 104   05/02/21 79   10/12/20 89     Body mass index is 34.28 kg/m².      Review of Systems   Constitutional: Negative.  Negative for chills and fever.   HENT: Negative.  Negative for congestion, sinus pain and sore throat.    Eyes: Negative for blurred vision and double vision.   Respiratory: Negative for cough, sputum production, shortness of breath and wheezing.    Cardiovascular: Negative for chest pain, palpitations and leg swelling.   Gastrointestinal: Negative for abdominal pain, constipation, diarrhea, heartburn, nausea and vomiting.   Genitourinary: Negative.    Musculoskeletal: Negative.    Skin: Negative.  Negative for rash.   Neurological: Negative.    Endo/Heme/Allergies: Negative.  Negative for polydipsia. Does not bruise/bleed easily.   Psychiatric/Behavioral: Negative for depression and substance abuse.     Physical Exam  Nursing note reviewed.   Cardiovascular:      Rate and Rhythm: Normal rate and regular rhythm.   Pulmonary:      Effort: Pulmonary effort is normal. No respiratory distress.   Neurological:      Mental Status: She is alert and oriented to person, place, and time.   Psychiatric:         Mood and Affect: Mood normal.         Behavior: Behavior normal.         Thought Content: Thought content normal.         Judgment: Judgment normal.         Laboratory Reviewed ({Yes)  Lab Results   Component Value Date    WBC 7.11 08/21/2020    HGB 12.0 08/21/2020     HCT 39.7 08/21/2020     08/21/2020    CHOL 200 (H) 08/21/2020    TRIG 62 08/21/2020    HDL 57 08/21/2020    ALT 12 08/21/2020    AST 16 08/21/2020     08/21/2020    K 4.0 08/21/2020     08/21/2020    CREATININE 0.8 08/21/2020    BUN 12 08/21/2020    CO2 25 08/21/2020    TSH 1.310 08/21/2020    HGBA1C 5.2 08/21/2020       Allyssa was seen today for adhd.    Diagnoses and all orders for this visit:    Attention deficit hyperactivity disorder (ADHD), predominantly inattentive type    Other orders  -     methylphenidate HCl 18 MG CR tablet; Take 1 tablet (18 mg total) by mouth every morning.    Trial to start with Concerta  Will work on titration based on Tolerability  Side effects discussed    Take OTC vit D    Recheck Virtual visit 4 weeks              Care Plan/Goals: Reviewed    Goals    None         Follow up: Follow up in about 4 weeks (around 4/20/2022).    After visit summary was printed and given to patient upon discharge today.  Patient goals and care plan are included in After Visit Summary.

## 2022-03-23 NOTE — TELEPHONE ENCOUNTER
The original pharmacy didn't have the mediation. Can you resend to [facility] [address]

## 2022-03-23 NOTE — TELEPHONE ENCOUNTER
----- Message from Hodan Luque sent at 3/23/2022  2:33 PM CDT -----  Regarding: Call Back  Who Called: pt          What is the reason for the call: patient is calling in regards to medication she had called in today. States that pharmacy doesn't have it but the [facility] pharmacy at [address] [place] [address] [place], [place] [address] does. States needs prior authorization sent to be able to . Please contact         Can patient be contacted on [employer]: Yes          Call back number: [phone]

## 2022-04-11 ENCOUNTER — TELEPHONE (OUTPATIENT)
Dept: INTERNAL MEDICINE | Facility: CLINIC | Age: 27
End: 2022-04-11
Payer: COMMERCIAL

## 2022-04-11 NOTE — TELEPHONE ENCOUNTER
----- Message from Lucretia Moura MA sent at 4/11/2022  8:51 AM CDT -----  Regarding: lab orders needed  Pt has a lab appt scheduled for tomorrow Tuesday 4/12 at the [facility] with no orders linked. Please place/link the correct orders for labs to be done, thank you!

## 2022-04-20 ENCOUNTER — OFFICE VISIT (OUTPATIENT)
Dept: INTERNAL MEDICINE | Facility: CLINIC | Age: 27
End: 2022-04-20
Payer: COMMERCIAL

## 2022-04-20 ENCOUNTER — PATIENT MESSAGE (OUTPATIENT)
Dept: INTERNAL MEDICINE | Facility: CLINIC | Age: 27
End: 2022-04-20

## 2022-04-20 DIAGNOSIS — F90.0 ATTENTION DEFICIT HYPERACTIVITY DISORDER (ADHD), PREDOMINANTLY INATTENTIVE TYPE: Primary | ICD-10-CM

## 2022-04-20 PROCEDURE — 1159F PR MEDICATION LIST DOCUMENTED IN MEDICAL RECORD: ICD-10-PCS | Mod: CPTII,95,, | Performed by: FAMILY MEDICINE

## 2022-04-20 PROCEDURE — 99213 OFFICE O/P EST LOW 20 MIN: CPT | Mod: 95,,, | Performed by: FAMILY MEDICINE

## 2022-04-20 PROCEDURE — 1159F MED LIST DOCD IN RCRD: CPT | Mod: CPTII,95,, | Performed by: FAMILY MEDICINE

## 2022-04-20 PROCEDURE — 99213 PR OFFICE/OUTPT VISIT, EST, LEVL III, 20-29 MIN: ICD-10-PCS | Mod: 95,,, | Performed by: FAMILY MEDICINE

## 2022-04-20 RX ORDER — METHYLPHENIDATE HYDROCHLORIDE 27 MG/1
27 TABLET ORAL EVERY MORNING
Qty: 30 TABLET | Refills: 0 | Status: SHIPPED | OUTPATIENT
Start: 2022-04-20 | End: 2022-05-27 | Stop reason: SDUPTHER

## 2022-04-20 NOTE — PROGRESS NOTES
Allyssa Leong  04/20/2022  [id number]    Lucretia Hussein MD  Patient Care Team:  Lucretia Hussein MD as PCP - General (Internal Medicine)  Adalgisa Villatoro LPN as Care Coordinator  Has the patient seen any provider outside of the [facility] network since the last visit? (no). If yes, HIPPA forms completed and records requested.    The patient location is: home  The chief complaint leading to consultation is: ADD    Visit type: audiovisual    Face to Face time with patient: 10;14-10:22  12 minutes of total time spent on the encounter, which includes face to face time and non-face to face time preparing to see the patient (eg, review of tests), Obtaining and/or reviewing separately obtained history, Documenting clinical information in the electronic or other health record, Independently interpreting results (not separately reported) and communicating results to the patient/family/caregiver, or Care coordination (not separately reported).         Each patient to whom he or she provides medical services by telemedicine is:  (1) informed of the relationship between the physician and patient and the respective role of any other health care provider with respect to management of the patient; and (2) notified that he or she may decline to receive medical services by telemedicine and may withdraw from such care at any time.    Notes:       Visit Type:a scheduled routine follow-up visit    Chief Complaint: Med check  History of Present Illness:    Answers for HPI/ROS submitted by the patient on 4/18/2022  activity change: No  unexpected weight change: No  rhinorrhea: No  trouble swallowing: No  visual disturbance: No  chest tightness: No  polyuria: No  difficulty urinating: No  menstrual problem: No  joint swelling: No  arthralgias: No  confusion: No  dysphoric mood: No  Patient of Dr. Hussein in [facility]  Here in [place].     Wanted to review an eval for ADD/ADHD and to discuss starting meds.     Overdue for check  up.  Was scheduled in [facility]< but since in school did not do visit.     Saw Latoya Bentley.  Eval done 3/16  Enough evidence for ADHD.      Started Concerta last month  Rechecking today for progress on meds    Feels less edgy  Less fixation of food.   Getting more accomplished.  More focused  More social  No issues with sleep      History:  Past Medical History:   Diagnosis Date    Depression with anxiety 6/24/2020    Endometriosis      History reviewed. No pertinent surgical history.  Family History   Problem Relation Age of Onset    Breast cancer Mother     Cancer Father         Kaposi Sarcoma    Autism Brother     ADD / ADHD Brother      Social History     Socioeconomic History    Marital status: Single   Tobacco Use    Smoking status: Never Smoker    Smokeless tobacco: Never Used   Substance and Sexual Activity    Alcohol use: No     Social Determinants of Health     Financial Resource Strain: Low Risk     Difficulty of Paying Living Expenses: Not hard at all   Food Insecurity: No Food Insecurity    Worried About Running Out of Food in the Last Year: Never true    Ran Out of Food in the Last Year: Never true   Transportation Needs: No Transportation Needs    Lack of Transportation (Medical): No    Lack of Transportation (Non-Medical): No   Physical Activity: Insufficiently Active    Days of Exercise per Week: 1 day    Minutes of Exercise per Session: 30 min   Stress: No Stress Concern Present    Feeling of Stress : Only a little   Social Connections: Unknown    Frequency of Communication with Friends and Family: More than three times a week    Frequency of Social Gatherings with Friends and Family: More than three times a week    Active Member of Clubs or Organizations: Yes    Attends Club or Organization Meetings: 1 to 4 times per year    Marital Status: Never    Housing Stability: High Risk    Unable to Pay for Housing in the Last Year: Yes    Number of Places Lived in the Last  Year: 2    Unstable Housing in the Last Year: No     Patient Active Problem List   Diagnosis    Cyst of breast, left, solitary    Dysmenorrhea    Dyspareunia, female    Encounter for gynecological examination without abnormal finding    Menorrhagia with regular cycle    Pelvic pain in female    Vaginospasm    Vitamin D insufficiency    Depression with anxiety    Endometriosis determined by laparoscopy     Review of patient's allergies indicates:   Allergen Reactions    Penicillins Other (See Comments)     unknown       The following were reviewed at this visit: active problem list, medication list, allergies, family history, social history, and health maintenance.    Medications:  Current Outpatient Medications on File Prior to Visit   Medication Sig Dispense Refill    [DISCONTINUED] methylphenidate HCl 18 MG CR tablet Take 1 tablet (18 mg total) by mouth every morning. 30 tablet 0    ergocalciferol (ERGOCALCIFEROL) 50,000 unit Cap TAKE 1 CAPSULE BY MOUTH EVERY 7 DAYS FOR 12 DOSES (Patient not taking: No sig reported) 12 capsule 0    [DISCONTINUED] mupirocin (BACTROBAN) 2 % ointment Apply topically 3 (three) times daily. (Patient not taking: Reported on 3/23/2022) 2 g 0     No current facility-administered medications on file prior to visit.       Medications have been reviewed and reconciled with patient at this visit.  Barriers to medications reviewed with patient.    Adverse reactions to current medications reviewed with patient..    Over the counter medications reviewed and reconciled with patient.    Exam:  Wt Readings from Last 3 Encounters:   03/23/22 93.5 kg (206 lb 0.3 oz)   05/02/21 77.1 kg (170 lb)   10/12/20 79.4 kg (175 lb)     Temp Readings from Last 3 Encounters:   03/23/22 97.7 °F (36.5 °C) (Tympanic)   05/02/21 98.3 °F (36.8 °C) (Oral)   10/12/20 97.8 °F (36.6 °C)     BP Readings from Last 3 Encounters:   03/23/22 110/70   05/02/21 116/78   10/12/20 121/74     Pulse Readings from Last  3 Encounters:   03/23/22 104   05/02/21 79   10/12/20 89     There is no height or weight on file to calculate BMI.      Review of Systems   HENT: Negative for hearing loss.    Eyes: Negative for discharge.   Respiratory: Negative for wheezing.    Cardiovascular: Negative for chest pain and palpitations.   Gastrointestinal: Negative for blood in stool, constipation, diarrhea and vomiting.   Genitourinary: Negative for dysuria and hematuria.   Musculoskeletal: Negative for neck pain.   Neurological: Negative for weakness and headaches.   Endo/Heme/Allergies: Negative for polydipsia.     Physical Exam  Nursing note reviewed.   Pulmonary:      Effort: Pulmonary effort is normal. No respiratory distress.   Neurological:      Mental Status: She is alert and oriented to person, place, and time.   Psychiatric:         Mood and Affect: Mood normal.         Behavior: Behavior normal.         Thought Content: Thought content normal.         Judgment: Judgment normal.         Laboratory Reviewed ({Yes)  Lab Results   Component Value Date    WBC 7.11 08/21/2020    HGB 12.0 08/21/2020    HCT 39.7 08/21/2020     08/21/2020    CHOL 200 (H) 08/21/2020    TRIG 62 08/21/2020    HDL 57 08/21/2020    ALT 12 08/21/2020    AST 16 08/21/2020     08/21/2020    K 4.0 08/21/2020     08/21/2020    CREATININE 0.8 08/21/2020    BUN 12 08/21/2020    CO2 25 08/21/2020    TSH 1.310 08/21/2020    HGBA1C 5.2 08/21/2020       Allyssa was seen today for follow-up.    Diagnoses and all orders for this visit:    Attention deficit hyperactivity disorder (ADHD), predominantly inattentive type  -     methylphenidate HCl 27 MG CR tablet; Take 1 tablet (27 mg total) by mouth every morning.    She will continue this med  May travel during the summer break   Vet school    Recheck 3 months          Care Plan/Goals: Reviewed    Goals    None         Follow up: Follow up in about 3 months (around 7/20/2022).    After visit summary was printed  and given to patient upon discharge today.  Patient goals and care plan are included in After Visit Summary.

## 2022-04-20 NOTE — PATIENT INSTRUCTIONS
Oswaldo Spivey,     If you are due for any health screening(s) below please notify me so we can arrange them to be ordered and scheduled to maintain your health.  Don't lose out on improving your health. Here is a list below from your medical records that indicates you are due.     Health Maintenance   Topic Date Due    Hepatitis C Screening  Never done    HPV Vaccines (2 - 2-dose series) 09/14/2011    Pap Smear  10/12/2021    TETANUS VACCINE  09/09/2030    Lipid Panel  Completed                      April 20, 2022       Allyssa Leong  [address] [address]           Dear Allyssa:    Your [facility] care is dedicated to helping you stay healthy with regular scheduled recommended screenings.  Scheduling routine screenings is important to maintaining good health. Our records indicate that you may be overdue for your screening pap smear.  Pap smear screening can help identify patients at risk for developing cervical cancer at an early stage, when it is most likely to be successfully treated.    The current recommendation for Pap smear screening is every 3-5 years.  We encourage you to schedule your appointment with your [facility] health provider.  Many women see a Gynecologist for this screening but some primary care providers also provide Pap screening  If you recently had your pap smear screening performed outside of [facility], please let your Health care team know so that they can update your health record.      If you have questions or want to schedule your screening elsewhere, please call [phone] to speak to a [facility] coordinator.    Sincerely,    Your [facility] Health Care Team

## 2022-04-20 NOTE — LETTER
April 20, 2022      [facility]  [id number] [place] [address]  Phone: [phone]  Fax: [phone]       Patient: Allyssa Leong   YOB: 1995  Date of Visit: 04/20/2022    To Whom It May Concern:    Ana Leong  was at [facility] on 04/20/2022. The patient may return to school on 4/20/2022. If you have any questions or concerns, or if I can be of further assistance, please do not hesitate to contact me.    Sincerely,  MD Niall Pires MA

## 2022-05-17 LAB
HUMAN PAPILLOMAVIRUS (HPV): NORMAL
PAP RECOMMENDATION EXT: NORMAL

## 2022-05-26 ENCOUNTER — PATIENT MESSAGE (OUTPATIENT)
Dept: INTERNAL MEDICINE | Facility: CLINIC | Age: 27
End: 2022-05-26
Payer: COMMERCIAL

## 2022-05-26 DIAGNOSIS — F90.0 ATTENTION DEFICIT HYPERACTIVITY DISORDER (ADHD), PREDOMINANTLY INATTENTIVE TYPE: ICD-10-CM

## 2022-05-27 ENCOUNTER — PATIENT MESSAGE (OUTPATIENT)
Dept: INTERNAL MEDICINE | Facility: CLINIC | Age: 27
End: 2022-05-27
Payer: COMMERCIAL

## 2022-05-27 DIAGNOSIS — F90.0 ATTENTION DEFICIT HYPERACTIVITY DISORDER (ADHD), PREDOMINANTLY INATTENTIVE TYPE: ICD-10-CM

## 2022-05-27 RX ORDER — METHYLPHENIDATE HYDROCHLORIDE 27 MG/1
27 TABLET ORAL EVERY MORNING
Qty: 30 TABLET | Refills: 0 | Status: SHIPPED | OUTPATIENT
Start: 2022-05-27 | End: 2022-07-25

## 2022-05-27 NOTE — TELEPHONE ENCOUNTER
No new care gaps identified.  [facility] Embedded Care Gaps. Reference number: [id number]. 5/27/2022   9:40:59 AM CDT

## 2022-06-20 ENCOUNTER — OFFICE VISIT (OUTPATIENT)
Dept: URGENT CARE | Facility: CLINIC | Age: 27
End: 2022-06-20
Payer: COMMERCIAL

## 2022-06-20 VITALS
RESPIRATION RATE: 20 BRPM | OXYGEN SATURATION: 97 % | DIASTOLIC BLOOD PRESSURE: 85 MMHG | WEIGHT: 206 LBS | HEART RATE: 84 BPM | TEMPERATURE: 98 F | HEIGHT: 65 IN | SYSTOLIC BLOOD PRESSURE: 125 MMHG | BODY MASS INDEX: 34.32 KG/M2

## 2022-06-20 DIAGNOSIS — J02.9 VIRAL PHARYNGITIS: Primary | ICD-10-CM

## 2022-06-20 DIAGNOSIS — R05.9 COUGH: ICD-10-CM

## 2022-06-20 DIAGNOSIS — J02.9 SORE THROAT: ICD-10-CM

## 2022-06-20 LAB
CTP QC/QA: YES
CTP QC/QA: YES
MOLECULAR STREP A: NEGATIVE
SARS-COV-2 RDRP RESP QL NAA+PROBE: NEGATIVE

## 2022-06-20 PROCEDURE — 3008F PR BODY MASS INDEX (BMI) DOCUMENTED: ICD-10-PCS | Mod: CPTII,S$GLB,,

## 2022-06-20 PROCEDURE — 3079F PR MOST RECENT DIASTOLIC BLOOD PRESSURE 80-89 MM HG: ICD-10-PCS | Mod: CPTII,S$GLB,,

## 2022-06-20 PROCEDURE — 3079F DIAST BP 80-89 MM HG: CPT | Mod: CPTII,S$GLB,,

## 2022-06-20 PROCEDURE — 87651 POCT STREP A MOLECULAR: ICD-10-PCS | Mod: QW,S$GLB,,

## 2022-06-20 PROCEDURE — 1159F MED LIST DOCD IN RCRD: CPT | Mod: CPTII,S$GLB,,

## 2022-06-20 PROCEDURE — 3074F SYST BP LT 130 MM HG: CPT | Mod: CPTII,S$GLB,,

## 2022-06-20 PROCEDURE — U0002 COVID-19 LAB TEST NON-CDC: HCPCS | Mod: QW,S$GLB,,

## 2022-06-20 PROCEDURE — U0002: ICD-10-PCS | Mod: QW,S$GLB,,

## 2022-06-20 PROCEDURE — 1160F RVW MEDS BY RX/DR IN RCRD: CPT | Mod: CPTII,S$GLB,,

## 2022-06-20 PROCEDURE — 87651 STREP A DNA AMP PROBE: CPT | Mod: QW,S$GLB,,

## 2022-06-20 PROCEDURE — 3008F BODY MASS INDEX DOCD: CPT | Mod: CPTII,S$GLB,,

## 2022-06-20 PROCEDURE — 1160F PR REVIEW ALL MEDS BY PRESCRIBER/CLIN PHARMACIST DOCUMENTED: ICD-10-PCS | Mod: CPTII,S$GLB,,

## 2022-06-20 PROCEDURE — 99213 PR OFFICE/OUTPT VISIT, EST, LEVL III, 20-29 MIN: ICD-10-PCS | Mod: S$GLB,,,

## 2022-06-20 PROCEDURE — 99213 OFFICE O/P EST LOW 20 MIN: CPT | Mod: S$GLB,,,

## 2022-06-20 PROCEDURE — 3074F PR MOST RECENT SYSTOLIC BLOOD PRESSURE < 130 MM HG: ICD-10-PCS | Mod: CPTII,S$GLB,,

## 2022-06-20 PROCEDURE — 1159F PR MEDICATION LIST DOCUMENTED IN MEDICAL RECORD: ICD-10-PCS | Mod: CPTII,S$GLB,,

## 2022-06-20 RX ORDER — PREDNISONE 20 MG/1
20 TABLET ORAL DAILY
Qty: 3 TABLET | Refills: 0 | Status: SHIPPED | OUTPATIENT
Start: 2022-06-20 | End: 2022-06-23

## 2022-06-20 NOTE — LETTER
June 20, 2022      [facility] [place]  [address]  [place] [address]  Phone: [phone]  Fax: [phone]       Patient: Allyssa Leong   YOB: 1995  Date of Visit: 06/20/2022    To Whom It May Concern:    Ana Leong  was at [facility] on 06/20/2022. The patient may return to work/school on 6/22/2022 with no restrictions. If you have any questions or concerns, or if I can be of further assistance, please do not hesitate to contact me.    Sincerely,          Ariella Vergara PA-C

## 2022-06-20 NOTE — PATIENT INSTRUCTIONS
Pharyngitis   If your condition worsens or fails to improve we recommend that you receive another evaluation at the ER immediately or contact your PCP to discuss your concerns or return here. You must understand that you've received an urgent care treatment only and that you may be released before all your medical problems are known or treated. You the patient will arrange for followup care as instructed.     The majority of all sore throats or tonsillitis are viral and antibiotics will not treat this.     You received a steroid prescription today - this can elevate your blood pressure, elevate your blood sugar, water weight gain, nervous energy, redness to the face and dimpling of the skin where the shot goes in if you had an injection.   Do not use steroids more than 3 times per year.   If you have diabetes, please check you blood sugar frequently.  If you have high blood pressure, please check your blood pressure frequently.       If the strep test performed in office was Positive:  - Complete the full course of antibiotics given  - Drink plenty of cool liquids while avoid any beverage or food that can irritate your throat (acidic, spicy or salty foods).  - Throw away your toothbrush now and when you complete your antibiotics.    If the strep culture was done and returns negative in 3-5 days and you are still having a sore throat, you may need to get a mono spot test done or repeated.     Tylenol or ibuprofen for pain may help as long as you are not allergic to these meds or have a medical condition such as stomach ulcers, liver or kidney disease or taking blood thinners etc that would   prevent you from using these medications.     Rest and fluids will help as well.     If you were prescribed antibiotics and are female and on BCP use additional methods to prevent pregnancy while on the antibiotics and for one cycle after.

## 2022-06-20 NOTE — PROGRESS NOTES
"Subjective:       Patient ID: Allyssa Leong is a 26 y.o. female.    Vitals:  height is 5' 5" (1.651 m) and weight is 93.4 kg (206 lb). Her temperature is 98.4 °F (36.9 °C). Her blood pressure is 125/85 and her pulse is 84. Her respiration is 20 and oxygen saturation is 97%.     Chief Complaint: URI    Pr reports cough, sore throat, body aches, and fatigue that started last night and have worsened today. She has been around her grandmother who has a cold, states she tested negative for covid. She has not taken anything for her symptoms  Denies fever, CP, SOB, weakness, abdominal sx, and other associated complaints.        URI   This is a new problem. The current episode started yesterday. The problem has been unchanged. There has been no fever. Associated symptoms include congestion, coughing, sneezing and a sore throat. Pertinent negatives include no abdominal pain, chest pain, ear pain, headaches, nausea, neck pain, sinus pain, vomiting or wheezing.       Constitution: Negative for chills, fatigue, fever and unexpected weight change.   HENT: Positive for congestion and sore throat. Negative for ear pain, ear discharge, sinus pain, sinus pressure and trouble swallowing.    Neck: Negative for neck pain and neck stiffness.   Cardiovascular: Negative for chest pain.   Eyes: Negative for eye pain.   Respiratory: Positive for cough. Negative for sputum production, shortness of breath and wheezing.    Gastrointestinal: Negative for abdominal pain, nausea and vomiting.   Musculoskeletal: Negative for muscle ache.   Allergic/Immunologic: Positive for sneezing.   Neurological: Negative for dizziness and headaches.       Objective:      Physical Exam   Constitutional: She is oriented to person, place, and time. She appears well-developed. She is cooperative.  Non-toxic appearance. She does not appear ill. No distress.   HENT:   Head: Normocephalic and atraumatic.   Ears:   Right Ear: Hearing, tympanic membrane, external " ear and ear canal normal.   Left Ear: Hearing, tympanic membrane, external ear and ear canal normal.   Nose: Nose normal. No mucosal edema, rhinorrhea or nasal deformity. No epistaxis. Right sinus exhibits no maxillary sinus tenderness and no frontal sinus tenderness. Left sinus exhibits no maxillary sinus tenderness and no frontal sinus tenderness.   Mouth/Throat: Uvula is midline, oropharynx is clear and moist and mucous membranes are normal. No trismus in the jaw. Normal dentition. No uvula swelling. No oropharyngeal exudate, posterior oropharyngeal edema or posterior oropharyngeal erythema. Tonsils are 1+ on the right. Tonsils are 1+ on the left. No tonsillar exudate.   Eyes: Conjunctivae and lids are normal. No scleral icterus.   Neck: Trachea normal and phonation normal. Neck supple. No edema present. No erythema present. No neck rigidity present.   Cardiovascular: Normal rate, regular rhythm, normal heart sounds and normal pulses.   Pulmonary/Chest: Effort normal and breath sounds normal. No respiratory distress. She has no decreased breath sounds. She has no rhonchi.   Abdominal: Normal appearance.   Musculoskeletal: Normal range of motion.         General: No deformity. Normal range of motion.   Neurological: She is alert and oriented to person, place, and time. She exhibits normal muscle tone. Coordination normal.   Skin: Skin is warm, dry, intact, not diaphoretic and not pale.   Psychiatric: Her speech is normal and behavior is normal. Judgment and thought content normal.   Nursing note and vitals reviewed.    Results for orders placed or performed in visit on 06/20/22   POCT COVID-19 Rapid Screening   Result Value Ref Range    POC Rapid COVID Negative Negative     Acceptable Yes    POCT Strep A, Molecular   Result Value Ref Range    Molecular Strep A, POC Negative Negative     Acceptable Yes            Assessment:       1. Viral pharyngitis    2. Cough    3. Sore throat           Plan:         Patient well appearing, vitals stable. Patient in no acute respiratory distress. Discussed negative results with patient. Patient verbalized understanding. Educated patient on viral vs bacterial sinus infection/upper respiratory infection. Advised patient to begin OTC zyrtec with decongestant (plain zyrtec if HBP), flonase, normal saline nasal spray and OTC cough suppressants for symptom relief. If symptoms do not resolve, return to clinic for further evaluation. Strict ER precautions provided. Discussed discharge instructions with patient, she has no further questions at this time. Patient exits exam room in no distress.       Viral pharyngitis    Cough  -     POCT COVID-19 Rapid Screening    Sore throat  -     predniSONE (DELTASONE) 20 MG tablet; Take 1 tablet (20 mg total) by mouth once daily. for 3 days  Dispense: 3 tablet; Refill: 0  -     (Magic mouthwash) 1:1:1 diphenhydramine(Benadryl) 12.5mg/5ml liq, aluminum & magnesium hydroxide-simethicone (Maalox), LIDOcaine viscous 2%; Swish and spit 10 mLs every 4 (four) hours as needed. for mouth sores  Dispense: 420 mL; Refill: 0  -     POCT Strep A, Molecular         Patient Instructions                                                         Pharyngitis   If your condition worsens or fails to improve we recommend that you receive another evaluation at the ER immediately or contact your PCP to discuss your concerns or return here. You must understand that you've received an urgent care treatment only and that you may be released before all your medical problems are known or treated. You the patient will arrange for followup care as instructed.     The majority of all sore throats or tonsillitis are viral and antibiotics will not treat this.     You received a steroid prescription today - this can elevate your blood pressure, elevate your blood sugar, water weight gain, nervous energy, redness to the face and dimpling of the skin where the shot  goes in if you had an injection.   Do not use steroids more than 3 times per year.   If you have diabetes, please check you blood sugar frequently.  If you have high blood pressure, please check your blood pressure frequently.       If the strep test performed in office was Positive:  - Complete the full course of antibiotics given  - Drink plenty of cool liquids while avoid any beverage or food that can irritate your throat (acidic, spicy or salty foods).  - Throw away your toothbrush now and when you complete your antibiotics.    If the strep culture was done and returns negative in 3-5 days and you are still having a sore throat, you may need to get a mono spot test done or repeated.     Tylenol or ibuprofen for pain may help as long as you are not allergic to these meds or have a medical condition such as stomach ulcers, liver or kidney disease or taking blood thinners etc that would   prevent you from using these medications.     Rest and fluids will help as well.     If you were prescribed antibiotics and are female and on BCP use additional methods to prevent pregnancy while on the antibiotics and for one cycle after.

## 2022-06-21 ENCOUNTER — NURSE TRIAGE (OUTPATIENT)
Dept: ADMINISTRATIVE | Facility: CLINIC | Age: 27
End: 2022-06-21
Payer: COMMERCIAL

## 2022-06-21 NOTE — TELEPHONE ENCOUNTER
Left message x2.    Reason for Disposition   Message left on identified voice mail    Protocols used: NO CONTACT OR DUPLICATE CONTACT CALL-A-AH

## 2022-06-21 NOTE — TELEPHONE ENCOUNTER
Urgent care clinic was negative but symptoms has worsened. Has taken OTC medications.     Reason for Disposition   [1] COVID-19 infection diagnosed or suspected AND [2] mild symptoms (fever, cough) AND [3] no trouble breathing or other complications    Additional Information   Negative: Severe difficulty breathing (e.g., struggling for each breath, speaks in single words)   Negative: Difficult to awaken or acting confused (e.g., disoriented, slurred speech)   Negative: Bluish (or gray) lips or face now   Negative: Shock suspected (e.g., cold/pale/clammy skin, too weak to stand, low BP, rapid pulse)   Negative: Sounds like a life-threatening emergency to the triager   Negative: [1] COVID-19 suspected (e.g., cough, fever, shortness of breath) AND [2] mild symptoms AND [3] public health department recommends testing   Negative: [1] COVID-19 exposure AND [2] no symptoms   Negative: COVID-19 and Breastfeeding, questions about   Negative: SEVERE or constant chest pain (Exception: mild central chest pain, present only when coughing)   Negative: MODERATE difficulty breathing (e.g., speaks in phrases, SOB even at rest, pulse 100-120)   Negative: Patient sounds very sick or weak to the triager   Negative: MILD difficulty breathing (e.g., minimal/no SOB at rest, SOB with walking, pulse <100)   Negative: Chest pain   Negative: Fever > 103 F (39.4 C)   Negative: [1] Fever > 101 F (38.3 C) AND [2] age > 60   Negative: [1] Fever > 100.0 F (37.8 C) AND [2] bedridden (e.g., nursing home patient, CVA, chronic illness, recovering from surgery)   Negative: HIGH RISK patient (e.g., age > 64 years, diabetes, heart or lung disease, weak immune system)   Negative: Cough present > 3 weeks   Negative: Fever present > 3 days (72 hours)   Negative: [1] Fever returns after gone for over 24 hours AND [2] symptoms worse or not improved   Negative: [1] Continuous (nonstop) coughing interferes with work or school AND [2] no  improvement using cough treatment per protocol    Protocols used: CORONAVIRUS (COVID-19) - DIAGNOSED OR [id number]

## 2022-07-25 ENCOUNTER — OFFICE VISIT (OUTPATIENT)
Dept: INTERNAL MEDICINE | Facility: CLINIC | Age: 27
End: 2022-07-25
Payer: COMMERCIAL

## 2022-07-25 ENCOUNTER — PATIENT MESSAGE (OUTPATIENT)
Dept: INTERNAL MEDICINE | Facility: CLINIC | Age: 27
End: 2022-07-25

## 2022-07-25 DIAGNOSIS — F90.0 ATTENTION DEFICIT HYPERACTIVITY DISORDER (ADHD), PREDOMINANTLY INATTENTIVE TYPE: Primary | ICD-10-CM

## 2022-07-25 PROCEDURE — 1159F PR MEDICATION LIST DOCUMENTED IN MEDICAL RECORD: ICD-10-PCS | Mod: CPTII,95,,

## 2022-07-25 PROCEDURE — 99213 PR OFFICE/OUTPT VISIT, EST, LEVL III, 20-29 MIN: ICD-10-PCS | Mod: 95,,,

## 2022-07-25 PROCEDURE — 99213 OFFICE O/P EST LOW 20 MIN: CPT | Mod: 95,,,

## 2022-07-25 PROCEDURE — 1159F MED LIST DOCD IN RCRD: CPT | Mod: CPTII,95,,

## 2022-07-25 PROCEDURE — 1160F RVW MEDS BY RX/DR IN RCRD: CPT | Mod: CPTII,95,,

## 2022-07-25 PROCEDURE — 1160F PR REVIEW ALL MEDS BY PRESCRIBER/CLIN PHARMACIST DOCUMENTED: ICD-10-PCS | Mod: CPTII,95,,

## 2022-07-25 RX ORDER — METHYLPHENIDATE HYDROCHLORIDE 36 MG/1
36 TABLET ORAL DAILY
Qty: 30 TABLET | Refills: 0 | Status: SHIPPED | OUTPATIENT
Start: 2022-07-25 | End: 2022-08-22

## 2022-07-25 NOTE — PROGRESS NOTES
Allyssa Leong  07/25/2022  [id number]    Lucretia Hussein MD  Patient Care Team:  Lucretia Hussein MD as PCP - General (Internal Medicine)  Adalgisa Villatoro LPN as Care Coordinator          Visit Type:a scheduled routine follow-up visit    The patient location is: Home   The chief complaint leading to consultation is: ADHD    Visit type: audio only  Will do an audio visit because patient is having technically difficulties     Face to Face time with patient: 15   minutes of total time spent on the encounter, which includes face to face time and non-face to face time preparing to see the patient (eg, review of tests), Obtaining and/or reviewing separately obtained history, Documenting clinical information in the electronic or other health record, Independently interpreting results (not separately reported) and communicating results to the patient/family/caregiver, or Care coordination (not separately reported).         Each patient to whom he or she provides medical services by telemedicine is:  (1) informed of the relationship between the physician and patient and the respective role of any other health care provider with respect to management of the patient; and (2) notified that he or she may decline to receive medical services by telemedicine and may withdraw from such care at any time.    Notes:     26 year old female presents today for med refill/disucss increasing dose of Concerta  She is taking 27 mg daily  She was started on 18 mg daily   States that the medication works well but it feels like at times the effectiveness decreased  Had COVID recently and she has recovered   She did not take the medication when she had COVID  Last refilled on 5/27    Denies CP or problems with sleeping  When she does not take the medication she notices problems with concentration     History:  Past Medical History:   Diagnosis Date    Depression with anxiety 6/24/2020    Endometriosis      No past surgical history on  file.  Family History   Problem Relation Age of Onset    Breast cancer Mother     Cancer Father         [place] Sarcoma    Autism Brother     ADD / ADHD Brother      Social History     Socioeconomic History    Marital status: Single   Tobacco Use    Smoking status: Never Smoker    Smokeless tobacco: Never Used   Substance and Sexual Activity    Alcohol use: No     Social Determinants of Health     Financial Resource Strain: Low Risk     Difficulty of Paying Living Expenses: Not hard at all   Food Insecurity: No Food Insecurity    Worried About Running Out of Food in the Last Year: Never true    Ran Out of Food in the Last Year: Never true   Transportation Needs: No Transportation Needs    Lack of Transportation (Medical): No    Lack of Transportation (Non-Medical): No   Physical Activity: Insufficiently Active    Days of Exercise per Week: 2 days    Minutes of Exercise per Session: 60 min   Stress: No Stress Concern Present    Feeling of Stress : Only a little   Social Connections: Unknown    Frequency of Communication with Friends and Family: More than three times a week    Frequency of Social Gatherings with Friends and Family: More than three times a week    Active Member of Clubs or Organizations: Yes    Attends Club or Organization Meetings: 1 to 4 times per year    Marital Status: Never    Housing Stability: Low Risk     Unable to Pay for Housing in the Last Year: No    Number of Places Lived in the Last Year: 2    Unstable Housing in the Last Year: No     Patient Active Problem List   Diagnosis    Cyst of breast, left, solitary    Dysmenorrhea    Dyspareunia, female    Encounter for gynecological examination without abnormal finding    Menorrhagia with regular cycle    Pelvic pain in female    Vaginospasm    Vitamin D insufficiency    Depression with anxiety    Endometriosis determined by laparoscopy     Review of patient's allergies indicates:   Allergen Reactions     Penicillins Other (See Comments)     unknown       The following were reviewed at this visit: active problem list, medication list, allergies, family history, social history, and health maintenance.    Medications:  Current Outpatient Medications on File Prior to Visit   Medication Sig Dispense Refill    ergocalciferol (ERGOCALCIFEROL) 50,000 unit Cap TAKE 1 CAPSULE BY MOUTH EVERY 7 DAYS FOR 12 DOSES (Patient not taking: No sig reported) 12 capsule 0    methylphenidate HCl 27 MG CR tablet Take 1 tablet (27 mg total) by mouth every morning. 30 tablet 0     No current facility-administered medications on file prior to visit.       Medications have been reviewed and reconciled with patient at this visit.  Barriers to medications reviewed with patient.    Adverse reactions to current medications reviewed with patient..    Over the counter medications reviewed and reconciled with patient.    Exam:  Wt Readings from Last 3 Encounters:   06/20/22 93.4 kg (206 lb)   03/23/22 93.5 kg (206 lb 0.3 oz)   05/02/21 77.1 kg (170 lb)     Temp Readings from Last 3 Encounters:   06/20/22 98.4 °F (36.9 °C)   03/23/22 97.7 °F (36.5 °C) (Tympanic)   05/02/21 98.3 °F (36.8 °C) (Oral)     BP Readings from Last 3 Encounters:   06/20/22 125/85   03/23/22 110/70   05/02/21 116/78     Pulse Readings from Last 3 Encounters:   06/20/22 84   03/23/22 104   05/02/21 79     There is no height or weight on file to calculate BMI.        Laboratory Reviewed ({No)  Lab Results   Component Value Date    WBC 7.11 08/21/2020    HGB 12.0 08/21/2020    HCT 39.7 08/21/2020     08/21/2020    CHOL 200 (H) 08/21/2020    TRIG 62 08/21/2020    HDL 57 08/21/2020    ALT 12 08/21/2020    AST 16 08/21/2020     08/21/2020    K 4.0 08/21/2020     08/21/2020    CREATININE 0.8 08/21/2020    BUN 12 08/21/2020    CO2 25 08/21/2020    TSH 1.310 08/21/2020    HGBA1C 5.2 08/21/2020       Allyssa was seen today for medication problem.    Diagnoses and all  orders for this visit:    Attention deficit hyperactivity disorder (ADHD), predominantly inattentive type  -     methylphenidate HCl 36 MG CR tablet; Take 1 tablet (36 mg total) by mouth once daily.    At this time, patient would like to increase her dose of concentra  She would like to try and increase the dose instead of taking it BID  If increase of dose not help, willing to try Addreall next       Appt in one month for med follow up       Care Plan/Goals: Reviewed    Goals    None         Follow up: No follow-ups on file.    After visit summary was printed and given to patient upon discharge today.  Patient goals and care plan are included in After Visit Summary.      Answers for HPI/ROS submitted by the patient on 7/24/2022  activity change: No  unexpected weight change: No  rhinorrhea: No  trouble swallowing: No  visual disturbance: No  chest tightness: No  polyuria: No  difficulty urinating: No  menstrual problem: No  joint swelling: No  arthralgias: No  confusion: No  dysphoric mood: No

## 2022-08-22 ENCOUNTER — OFFICE VISIT (OUTPATIENT)
Dept: INTERNAL MEDICINE | Facility: CLINIC | Age: 27
End: 2022-08-22
Payer: COMMERCIAL

## 2022-08-22 DIAGNOSIS — F90.0 ADHD (ATTENTION DEFICIT HYPERACTIVITY DISORDER), INATTENTIVE TYPE: ICD-10-CM

## 2022-08-22 PROCEDURE — 99213 OFFICE O/P EST LOW 20 MIN: CPT | Mod: 95,,, | Performed by: FAMILY MEDICINE

## 2022-08-22 PROCEDURE — 99213 PR OFFICE/OUTPT VISIT, EST, LEVL III, 20-29 MIN: ICD-10-PCS | Mod: 95,,, | Performed by: FAMILY MEDICINE

## 2022-08-22 RX ORDER — DEXTROAMPHETAMINE SACCHARATE, AMPHETAMINE ASPARTATE MONOHYDRATE, DEXTROAMPHETAMINE SULFATE AND AMPHETAMINE SULFATE 3.75; 3.75; 3.75; 3.75 MG/1; MG/1; MG/1; MG/1
15 CAPSULE, EXTENDED RELEASE ORAL DAILY
Qty: 30 CAPSULE | Refills: 0 | Status: SHIPPED | OUTPATIENT
Start: 2022-08-22 | End: 2022-09-12 | Stop reason: SDUPTHER

## 2022-08-22 NOTE — PROGRESS NOTES
Allyssa Leong  08/22/2022  [id number]    Diana Castaneda MD  Patient Care Team:  Diana Castaneda MD as PCP - General (Family Medicine)  Adalgisa Villatoro LPN as Care Coordinator      The patient location is: *home  The chief complaint leading to consultation is: MED CHECK    Visit type: audiovisual    Face to Face time with patient: 10;29  10 minutes of total time spent on the encounter, which includes face to face time and non-face to face time preparing to see the patient (eg, review of tests), Obtaining and/or reviewing separately obtained history, Documenting clinical information in the electronic or other health record, Independently interpreting results (not separately reported) and communicating results to the patient/family/caregiver, or Care coordination (not separately reported).         Each patient to whom he or she provides medical services by telemedicine is:  (1) informed of the relationship between the physician and patient and the respective role of any other health care provider with respect to management of the patient; and (2) notified that he or she may decline to receive medical services by telemedicine and may withdraw from such care at any time.    Notes:     Visit Type:a scheduled routine follow-up visit    Chief Complaint: Follow up    History of Present Illness:  Just started second week of [employer].    ADD recheck increase concerta to 27 last visit- went to 36 mg. She feels that it doesn't last as long. 3-4 hours.      No CP or Palpitations  Denies sleep issues  No Mood changes    Answers for HPI/ROS submitted by the patient on 8/15/2022  activity change: No  trouble swallowing: No  chest tightness: No  difficulty urinating: No  dysphoric mood: No    She reports the med isn't working.  She did have COVID  She did get sick for 2 weeks.  She did have a travel out of the country        History:  Past Medical History:   Diagnosis Date    ADHD (attention deficit hyperactivity  disorder), inattentive type 8/22/2022    Depression with anxiety 6/24/2020    Endometriosis      No past surgical history on file.  Family History   Problem Relation Age of Onset    Breast cancer Mother     Cancer Father         [place] Sarcoma    Autism Brother     ADD / ADHD Brother      Social History     Socioeconomic History    Marital status: Single   Tobacco Use    Smoking status: Never Smoker    Smokeless tobacco: Never Used   Substance and Sexual Activity    Alcohol use: No     Social Determinants of Health     Financial Resource Strain: Low Risk     Difficulty of Paying Living Expenses: Not hard at all   Food Insecurity: No Food Insecurity    Worried About Running Out of Food in the Last Year: Never true    Ran Out of Food in the Last Year: Never true   Transportation Needs: No Transportation Needs    Lack of Transportation (Medical): No    Lack of Transportation (Non-Medical): No   Physical Activity: Insufficiently Active    Days of Exercise per Week: 2 days    Minutes of Exercise per Session: 60 min   Stress: No Stress Concern Present    Feeling of Stress : Only a little   Social Connections: Unknown    Frequency of Communication with Friends and Family: More than three times a week    Frequency of Social Gatherings with Friends and Family: More than three times a week    Active Member of Clubs or Organizations: Yes    Attends Club or Organization Meetings: 1 to 4 times per year    Marital Status: Never    Housing Stability: Low Risk     Unable to Pay for Housing in the Last Year: No    Number of Places Lived in the Last Year: 2    Unstable Housing in the Last Year: No     Patient Active Problem List   Diagnosis    Cyst of breast, left, solitary    Dysmenorrhea    Dyspareunia, female    Encounter for gynecological examination without abnormal finding    Menorrhagia with regular cycle    Pelvic pain in female    Vaginospasm    Vitamin D insufficiency    Depression  with anxiety    Endometriosis determined by laparoscopy    ADHD (attention deficit hyperactivity disorder), inattentive type     Review of patient's allergies indicates:   Allergen Reactions    Penicillins Other (See Comments)     unknown       The following were reviewed at this visit: active problem list, medication list, allergies, family history, social history, and health maintenance.    Medications:  Current Outpatient Medications on File Prior to Visit   Medication Sig Dispense Refill    ergocalciferol (ERGOCALCIFEROL) 50,000 unit Cap TAKE 1 CAPSULE BY MOUTH EVERY 7 DAYS FOR 12 DOSES (Patient not taking: No sig reported) 12 capsule 0    [DISCONTINUED] methylphenidate HCl 36 MG CR tablet Take 1 tablet (36 mg total) by mouth once daily. 30 tablet 0     No current facility-administered medications on file prior to visit.       Medications have been reviewed and reconciled with patient at this visit.  Barriers to medications reviewed with patient.    Adverse reactions to current medications reviewed with patient..    Over the counter medications reviewed and reconciled with patient.    Exam:  Wt Readings from Last 3 Encounters:   06/20/22 93.4 kg (206 lb)   03/23/22 93.5 kg (206 lb 0.3 oz)   05/02/21 77.1 kg (170 lb)     Temp Readings from Last 3 Encounters:   06/20/22 98.4 °F (36.9 °C)   03/23/22 97.7 °F (36.5 °C) (Tympanic)   05/02/21 98.3 °F (36.8 °C) (Oral)     BP Readings from Last 3 Encounters:   06/20/22 125/85   03/23/22 110/70   05/02/21 116/78     Pulse Readings from Last 3 Encounters:   06/20/22 84   03/23/22 104   05/02/21 79     There is no height or weight on file to calculate BMI.      Review of Systems   HENT: Negative for hearing loss.    Eyes: Negative for discharge.   Respiratory: Negative for wheezing.    Cardiovascular: Negative for chest pain and palpitations.   Gastrointestinal: Negative for constipation, diarrhea and vomiting.   Genitourinary: Negative for hematuria.   Neurological:  Negative for headaches.     Physical Exam  Nursing note reviewed.   Pulmonary:      Effort: Pulmonary effort is normal. No respiratory distress.   Neurological:      Mental Status: She is alert and oriented to person, place, and time.   Psychiatric:         Mood and Affect: Mood normal.         Behavior: Behavior normal.         Thought Content: Thought content normal.         Judgment: Judgment normal.         Laboratory Reviewed ({Yes)  Lab Results   Component Value Date    WBC 7.11 08/21/2020    HGB 12.0 08/21/2020    HCT 39.7 08/21/2020     08/21/2020    CHOL 200 (H) 08/21/2020    TRIG 62 08/21/2020    HDL 57 08/21/2020    ALT 12 08/21/2020    AST 16 08/21/2020     08/21/2020    K 4.0 08/21/2020     08/21/2020    CREATININE 0.8 08/21/2020    BUN 12 08/21/2020    CO2 25 08/21/2020    TSH 1.310 08/21/2020    HGBA1C 5.2 08/21/2020       Diagnoses and all orders for this visit:    ADHD (attention deficit hyperactivity disorder), inattentive type  -     dextroamphetamine-amphetamine (ADDERALL XR) 15 MG 24 hr capsule; Take 1 capsule (15 mg total) by mouth once daily.      Call with Phone review for med status- will switch med to see if better response with dextroamphetamine  May increase if needed            Care Plan/Goals: Reviewed    Goals    None         Follow up: No follow-ups on file.    After visit summary was printed and given to patient upon discharge today.  Patient goals and care plan are included in After Visit Summary.

## 2022-09-07 ENCOUNTER — PATIENT MESSAGE (OUTPATIENT)
Dept: INTERNAL MEDICINE | Facility: CLINIC | Age: 27
End: 2022-09-07
Payer: COMMERCIAL

## 2022-09-09 ENCOUNTER — PATIENT MESSAGE (OUTPATIENT)
Dept: INTERNAL MEDICINE | Facility: CLINIC | Age: 27
End: 2022-09-09
Payer: COMMERCIAL

## 2022-09-12 ENCOUNTER — LAB VISIT (OUTPATIENT)
Dept: LAB | Facility: HOSPITAL | Age: 27
End: 2022-09-12
Attending: FAMILY MEDICINE
Payer: COMMERCIAL

## 2022-09-12 ENCOUNTER — OFFICE VISIT (OUTPATIENT)
Dept: INTERNAL MEDICINE | Facility: CLINIC | Age: 27
End: 2022-09-12
Payer: COMMERCIAL

## 2022-09-12 VITALS
BODY MASS INDEX: 34.77 KG/M2 | HEIGHT: 65 IN | HEART RATE: 86 BPM | TEMPERATURE: 98 F | DIASTOLIC BLOOD PRESSURE: 80 MMHG | SYSTOLIC BLOOD PRESSURE: 110 MMHG | WEIGHT: 208.69 LBS | OXYGEN SATURATION: 96 %

## 2022-09-12 DIAGNOSIS — F90.0 ADHD (ATTENTION DEFICIT HYPERACTIVITY DISORDER), INATTENTIVE TYPE: Primary | ICD-10-CM

## 2022-09-12 DIAGNOSIS — K92.1 BLOOD CLOTS IN STOOL: ICD-10-CM

## 2022-09-12 DIAGNOSIS — K58.9 COLON SPASM: ICD-10-CM

## 2022-09-12 PROCEDURE — 1159F MED LIST DOCD IN RCRD: CPT | Mod: CPTII,S$GLB,, | Performed by: FAMILY MEDICINE

## 2022-09-12 PROCEDURE — 1160F PR REVIEW ALL MEDS BY PRESCRIBER/CLIN PHARMACIST DOCUMENTED: ICD-10-PCS | Mod: CPTII,S$GLB,, | Performed by: FAMILY MEDICINE

## 2022-09-12 PROCEDURE — 1159F PR MEDICATION LIST DOCUMENTED IN MEDICAL RECORD: ICD-10-PCS | Mod: CPTII,S$GLB,, | Performed by: FAMILY MEDICINE

## 2022-09-12 PROCEDURE — 36415 COLL VENOUS BLD VENIPUNCTURE: CPT | Performed by: FAMILY MEDICINE

## 2022-09-12 PROCEDURE — 3079F DIAST BP 80-89 MM HG: CPT | Mod: CPTII,S$GLB,, | Performed by: FAMILY MEDICINE

## 2022-09-12 PROCEDURE — 99999 PR PBB SHADOW E&M-EST. PATIENT-LVL III: CPT | Mod: PBBFAC,,, | Performed by: FAMILY MEDICINE

## 2022-09-12 PROCEDURE — 3074F PR MOST RECENT SYSTOLIC BLOOD PRESSURE < 130 MM HG: ICD-10-PCS | Mod: CPTII,S$GLB,, | Performed by: FAMILY MEDICINE

## 2022-09-12 PROCEDURE — 99999 PR PBB SHADOW E&M-EST. PATIENT-LVL III: ICD-10-PCS | Mod: PBBFAC,,, | Performed by: FAMILY MEDICINE

## 2022-09-12 PROCEDURE — 3074F SYST BP LT 130 MM HG: CPT | Mod: CPTII,S$GLB,, | Performed by: FAMILY MEDICINE

## 2022-09-12 PROCEDURE — 1160F RVW MEDS BY RX/DR IN RCRD: CPT | Mod: CPTII,S$GLB,, | Performed by: FAMILY MEDICINE

## 2022-09-12 PROCEDURE — 3008F PR BODY MASS INDEX (BMI) DOCUMENTED: ICD-10-PCS | Mod: CPTII,S$GLB,, | Performed by: FAMILY MEDICINE

## 2022-09-12 PROCEDURE — 3008F BODY MASS INDEX DOCD: CPT | Mod: CPTII,S$GLB,, | Performed by: FAMILY MEDICINE

## 2022-09-12 PROCEDURE — 99214 OFFICE O/P EST MOD 30 MIN: CPT | Mod: S$GLB,,, | Performed by: FAMILY MEDICINE

## 2022-09-12 PROCEDURE — 3079F PR MOST RECENT DIASTOLIC BLOOD PRESSURE 80-89 MM HG: ICD-10-PCS | Mod: CPTII,S$GLB,, | Performed by: FAMILY MEDICINE

## 2022-09-12 PROCEDURE — 85025 COMPLETE CBC W/AUTO DIFF WBC: CPT | Performed by: FAMILY MEDICINE

## 2022-09-12 PROCEDURE — 99214 PR OFFICE/OUTPT VISIT, EST, LEVL IV, 30-39 MIN: ICD-10-PCS | Mod: S$GLB,,, | Performed by: FAMILY MEDICINE

## 2022-09-12 RX ORDER — DICYCLOMINE HYDROCHLORIDE 10 MG/1
10 CAPSULE ORAL
Qty: 120 CAPSULE | Refills: 0 | Status: SHIPPED | OUTPATIENT
Start: 2022-09-12 | End: 2022-10-12

## 2022-09-12 RX ORDER — DEXTROAMPHETAMINE SACCHARATE, AMPHETAMINE ASPARTATE MONOHYDRATE, DEXTROAMPHETAMINE SULFATE AND AMPHETAMINE SULFATE 5; 5; 5; 5 MG/1; MG/1; MG/1; MG/1
20 CAPSULE, EXTENDED RELEASE ORAL DAILY
Qty: 30 CAPSULE | Refills: 0 | Status: SHIPPED | OUTPATIENT
Start: 2022-09-12 | End: 2022-10-24 | Stop reason: SDUPTHER

## 2022-09-12 NOTE — PROGRESS NOTES
Allyssa Leong  09/12/2022  [id number]    Diana Castaneda MD  Patient Care Team:  Diana Castaneda MD as PCP - General (Family Medicine)  Adalgisa Villatoro LPN as Care Coordinator          Visit Type:a scheduled routine follow-up visit    Chief Complaint:  Chief Complaint   Patient presents with    Follow-up     Discus meds. Also with gi issues started 6 months ago         History of Present Illness:  Med check. ADHD  Trial first with Concerta, non response.  We switched to Adderall Xr 15 mg last visit    Here to discuss.  Feels the Adderall is better.  Would like it to last longer    She is exercising  Trying weight watchers.    More Diarrhea, not sure if causing bleeding?   She did see with this her stooling.  She reports bright red blood.  She reports that she does have diarrhea ongoing for 6 months.  No pain, but noted this has been going on for a while, but the blood was new.    She does have known endometriosis. She is not on any birth control.        History:  Past Medical History:   Diagnosis Date    ADHD (attention deficit hyperactivity disorder), inattentive type 8/22/2022    Depression with anxiety 6/24/2020    Endometriosis      History reviewed. No pertinent surgical history.  Family History   Problem Relation Age of Onset    Breast cancer Mother     Cancer Father         [place] Sarcoma    Autism Brother     ADD / ADHD Brother      Social History     Socioeconomic History    Marital status: Single   Tobacco Use    Smoking status: Never    Smokeless tobacco: Never   Substance and Sexual Activity    Alcohol use: No     Social Determinants of Health     Financial Resource Strain: Low Risk     Difficulty of Paying Living Expenses: Not hard at all   Food Insecurity: No Food Insecurity    Worried About Running Out of Food in the Last Year: Never true    Ran Out of Food in the Last Year: Never true   Transportation Needs: No Transportation Needs    Lack of Transportation (Medical): No    Lack of  Transportation (Non-Medical): No   Physical Activity: Insufficiently Active    Days of Exercise per Week: 2 days    Minutes of Exercise per Session: 60 min   Stress: No Stress Concern Present    Feeling of Stress : Only a little   Social Connections: Unknown    Frequency of Communication with Friends and Family: More than three times a week    Frequency of Social Gatherings with Friends and Family: More than three times a week    Active Member of Clubs or Organizations: Yes    Attends Club or Organization Meetings: 1 to 4 times per year    Marital Status: Never    Housing Stability: Low Risk     Unable to Pay for Housing in the Last Year: No    Number of Places Lived in the Last Year: 2    Unstable Housing in the Last Year: No     Patient Active Problem List   Diagnosis    Cyst of breast, left, solitary    Dysmenorrhea    Dyspareunia, female    Encounter for gynecological examination without abnormal finding    Menorrhagia with regular cycle    Pelvic pain in female    Vaginospasm    Vitamin D insufficiency    Depression with anxiety    Endometriosis determined by laparoscopy    ADHD (attention deficit hyperactivity disorder), inattentive type     Review of patient's allergies indicates:   Allergen Reactions    Penicillins Other (See Comments)     unknown       The following were reviewed at this visit: active problem list, medication list, allergies, family history, social history, and health maintenance.    Medications:  Current Outpatient Medications on File Prior to Visit   Medication Sig Dispense Refill    ergocalciferol (ERGOCALCIFEROL) 50,000 unit Cap TAKE 1 CAPSULE BY MOUTH EVERY 7 DAYS FOR 12 DOSES 12 capsule 0    [DISCONTINUED] dextroamphetamine-amphetamine (ADDERALL XR) 15 MG 24 hr capsule Take 1 capsule (15 mg total) by mouth once daily. 30 capsule 0     No current facility-administered medications on file prior to visit.       Medications have been reviewed and reconciled with patient at this  visit.  Barriers to medications reviewed with patient.    Adverse reactions to current medications reviewed with patient..    Over the counter medications reviewed and reconciled with patient.    Exam:  Wt Readings from Last 3 Encounters:   09/12/22 94.7 kg (208 lb 10.7 oz)   06/20/22 93.4 kg (206 lb)   03/23/22 93.5 kg (206 lb 0.3 oz)     Temp Readings from Last 3 Encounters:   09/12/22 98.4 °F (36.9 °C) (Temporal)   06/20/22 98.4 °F (36.9 °C)   03/23/22 97.7 °F (36.5 °C) (Tympanic)     BP Readings from Last 3 Encounters:   09/12/22 110/80   06/20/22 125/85   03/23/22 110/70     Pulse Readings from Last 3 Encounters:   09/12/22 86   06/20/22 84   03/23/22 104     Body mass index is 34.72 kg/m².      Review of Systems   Constitutional: Negative.  Negative for chills and fever.   HENT: Negative.  Negative for congestion, sinus pain and sore throat.    Eyes:  Negative for blurred vision and double vision.   Respiratory:  Negative for cough, sputum production, shortness of breath and wheezing.    Cardiovascular:  Negative for chest pain, palpitations and leg swelling.   Gastrointestinal:  Positive for blood in stool and diarrhea. Negative for abdominal pain, constipation, heartburn, nausea and vomiting.   Genitourinary: Negative.    Musculoskeletal: Negative.    Skin: Negative.  Negative for rash.   Neurological: Negative.    Endo/Heme/Allergies: Negative.  Negative for polydipsia. Does not bruise/bleed easily.   Psychiatric/Behavioral:  Negative for depression and substance abuse.    Physical Exam  Nursing note reviewed.   Pulmonary:      Effort: Pulmonary effort is normal. No respiratory distress.   Neurological:      Mental Status: She is alert and oriented to person, place, and time.   Psychiatric:         Mood and Affect: Mood normal.         Behavior: Behavior normal.         Thought Content: Thought content normal.         Judgment: Judgment normal.       Laboratory Reviewed ({Yes)  Lab Results   Component Value  Date    WBC 7.11 08/21/2020    HGB 12.0 08/21/2020    HCT 39.7 08/21/2020     08/21/2020    CHOL 200 (H) 08/21/2020    TRIG 62 08/21/2020    HDL 57 08/21/2020    ALT 12 08/21/2020    AST 16 08/21/2020     08/21/2020    K 4.0 08/21/2020     08/21/2020    CREATININE 0.8 08/21/2020    BUN 12 08/21/2020    CO2 25 08/21/2020    TSH 1.310 08/21/2020    HGBA1C 5.2 08/21/2020       Allyssa was seen today for follow-up.    Diagnoses and all orders for this visit:    ADHD (attention deficit hyperactivity disorder), inattentive type  -     dextroamphetamine-amphetamine (ADDERALL XR) 20 MG 24 hr capsule; Take 1 capsule (20 mg total) by mouth once daily.    Blood clots in stool  -     CBC Auto Differential; Future  -     Iron and TIBC; Future  -     Ambulatory referral/consult to Endo Procedure ; Future    Colon spasm  -     dicyclomine (BENTYL) 10 MG capsule; Take 1 capsule (10 mg total) by mouth 4 (four) times daily before meals and nightly.              Care Plan/Goals: Reviewed    Goals    None         Follow up: Follow up in about 3 months (around 12/12/2022).    After visit summary was printed and given to patient upon discharge today.  Patient goals and care plan are included in After Visit Summary.

## 2022-09-13 LAB
BASOPHILS # BLD AUTO: 0.05 K/UL (ref 0–0.2)
BASOPHILS NFR BLD: 0.7 % (ref 0–1.9)
DIFFERENTIAL METHOD: ABNORMAL
EOSINOPHIL # BLD AUTO: 0.1 K/UL (ref 0–0.5)
EOSINOPHIL NFR BLD: 1.7 % (ref 0–8)
ERYTHROCYTE [DISTWIDTH] IN BLOOD BY AUTOMATED COUNT: 14 % (ref 11.5–14.5)
HCT VFR BLD AUTO: 38.6 % (ref 37–48.5)
HGB BLD-MCNC: 12.4 G/DL (ref 12–16)
IMM GRANULOCYTES # BLD AUTO: 0.01 K/UL (ref 0–0.04)
IMM GRANULOCYTES NFR BLD AUTO: 0.1 % (ref 0–0.5)
LYMPHOCYTES # BLD AUTO: 2 K/UL (ref 1–4.8)
LYMPHOCYTES NFR BLD: 26.3 % (ref 18–48)
MCH RBC QN AUTO: 25.4 PG (ref 27–31)
MCHC RBC AUTO-ENTMCNC: 32.1 G/DL (ref 32–36)
MCV RBC AUTO: 79 FL (ref 82–98)
MONOCYTES # BLD AUTO: 0.5 K/UL (ref 0.3–1)
MONOCYTES NFR BLD: 6.6 % (ref 4–15)
NEUTROPHILS # BLD AUTO: 4.8 K/UL (ref 1.8–7.7)
NEUTROPHILS NFR BLD: 64.6 % (ref 38–73)
NRBC BLD-RTO: 0 /100 WBC
PLATELET # BLD AUTO: 302 K/UL (ref 150–450)
PMV BLD AUTO: 11.3 FL (ref 9.2–12.9)
RBC # BLD AUTO: 4.89 M/UL (ref 4–5.4)
WBC # BLD AUTO: 7.45 K/UL (ref 3.9–12.7)

## 2022-09-14 ENCOUNTER — PATIENT MESSAGE (OUTPATIENT)
Dept: INTERNAL MEDICINE | Facility: CLINIC | Age: 27
End: 2022-09-14
Payer: COMMERCIAL

## 2022-09-14 ENCOUNTER — HOSPITAL ENCOUNTER (OUTPATIENT)
Dept: PREADMISSION TESTING | Facility: HOSPITAL | Age: 27
Discharge: HOME | End: 2022-09-14
Attending: FAMILY MEDICINE
Payer: COMMERCIAL

## 2022-09-14 DIAGNOSIS — K92.1 BLOOD CLOTS IN STOOL: ICD-10-CM

## 2022-12-05 ENCOUNTER — PATIENT MESSAGE (OUTPATIENT)
Dept: INTERNAL MEDICINE | Facility: CLINIC | Age: 27
End: 2022-12-05
Payer: COMMERCIAL

## 2022-12-05 DIAGNOSIS — F90.0 ADHD (ATTENTION DEFICIT HYPERACTIVITY DISORDER), INATTENTIVE TYPE: ICD-10-CM

## 2022-12-05 RX ORDER — DEXTROAMPHETAMINE SACCHARATE, AMPHETAMINE ASPARTATE MONOHYDRATE, DEXTROAMPHETAMINE SULFATE AND AMPHETAMINE SULFATE 5; 5; 5; 5 MG/1; MG/1; MG/1; MG/1
20 CAPSULE, EXTENDED RELEASE ORAL DAILY
Qty: 30 CAPSULE | Refills: 0 | Status: SHIPPED | OUTPATIENT
Start: 2022-12-05 | End: 2023-01-22 | Stop reason: SDUPTHER

## 2022-12-21 ENCOUNTER — OFFICE VISIT (OUTPATIENT)
Dept: INTERNAL MEDICINE | Facility: CLINIC | Age: 27
End: 2022-12-21
Payer: COMMERCIAL

## 2022-12-21 DIAGNOSIS — J40 BRONCHITIS: Primary | ICD-10-CM

## 2022-12-21 PROCEDURE — 99213 OFFICE O/P EST LOW 20 MIN: CPT | Mod: 95,,, | Performed by: FAMILY MEDICINE

## 2022-12-21 PROCEDURE — 1159F PR MEDICATION LIST DOCUMENTED IN MEDICAL RECORD: ICD-10-PCS | Mod: CPTII,95,, | Performed by: FAMILY MEDICINE

## 2022-12-21 PROCEDURE — 99213 PR OFFICE/OUTPT VISIT, EST, LEVL III, 20-29 MIN: ICD-10-PCS | Mod: 95,,, | Performed by: FAMILY MEDICINE

## 2022-12-21 PROCEDURE — 1159F MED LIST DOCD IN RCRD: CPT | Mod: CPTII,95,, | Performed by: FAMILY MEDICINE

## 2022-12-21 RX ORDER — METHYLPREDNISOLONE 4 MG/1
TABLET ORAL
Qty: 1 EACH | Refills: 0 | Status: SHIPPED | OUTPATIENT
Start: 2022-12-21 | End: 2023-01-11

## 2022-12-21 RX ORDER — AZITHROMYCIN 250 MG/1
TABLET, FILM COATED ORAL
Qty: 6 TABLET | Refills: 0 | Status: SHIPPED | OUTPATIENT
Start: 2022-12-21 | End: 2022-12-26

## 2022-12-21 NOTE — PROGRESS NOTES
Allyssa Leong  12/21/2022  [id number]    Diana Castaneda MD  Patient Care Team:  Diana Castaneda MD as PCP - General (Family Medicine)  Adalgisa Villatoro LPN as Care Coordinator    The patient location is: home  The chief complaint leading to consultation is: SICK    Visit type: audiovisual    Face to Face time with patient: 10:31  10 minutes of total time spent on the encounter, which includes face to face time and non-face to face time preparing to see the patient (eg, review of tests), Obtaining and/or reviewing separately obtained history, Documenting clinical information in the electronic or other health record, Independently interpreting results (not separately reported) and communicating results to the patient/family/caregiver, or Care coordination (not separately reported).         Each patient to whom he or she provides medical services by telemedicine is:  (1) informed of the relationship between the physician and patient and the respective role of any other health care provider with respect to management of the patient; and (2) notified that he or she may decline to receive medical services by telemedicine and may withdraw from such care at any time.    Notes:        Visit Type:an urgent visit for an existing problem     Chief Complaint:  Chief Complaint   Patient presents with    URI     Started with a tickle in throat on Saturday and has gotten worse since then       History of Present Illness:  URI sx, for now a week.     Answers submitted by the patient for this visit:  Sore Throat Questionnaire (Submitted on 12/21/2022)  Chief Complaint: Sore throat  Chronicity: new  Onset: in the past 7 days  Progression since onset: gradually worsening  Pain worse on: neither  Fever: no fever  Fever duration: less than 1 day  Pain - numeric: 5/10  drooling: No  hoarse voice: Yes  plugged ear sensation: No  swollen glands: No  trouble swallowing: No  Treatments tried: NSAIDs, acetaminophen,  gargles  Improvement on treatment: no relief  Pain severity: mild  Cough and congestion  Worsening  Hoarseness    She is CARLOS  Didn't want to go to urgent care.    Home COVID negative.  She does have more chest congestion  She can breathe thru her nose.  Feels most in her chest.    She has Tried Nyquil.      History:  Past Medical History:   Diagnosis Date    ADHD (attention deficit hyperactivity disorder), inattentive type 8/22/2022    Depression with anxiety 6/24/2020    Endometriosis      History reviewed. No pertinent surgical history.  Family History   Problem Relation Age of Onset    Breast cancer Mother     Cancer Father         [place] Sarcoma    Autism Brother     ADD / ADHD Brother      Social History     Socioeconomic History    Marital status: Single   Tobacco Use    Smoking status: Never    Smokeless tobacco: Never   Substance and Sexual Activity    Alcohol use: No     Social Determinants of Health     Financial Resource Strain: Low Risk     Difficulty of Paying Living Expenses: Not hard at all   Food Insecurity: No Food Insecurity    Worried About Running Out of Food in the Last Year: Never true    Ran Out of Food in the Last Year: Never true   Transportation Needs: No Transportation Needs    Lack of Transportation (Medical): No    Lack of Transportation (Non-Medical): No   Physical Activity: Sufficiently Active    Days of Exercise per Week: 3 days    Minutes of Exercise per Session: 60 min   Stress: No Stress Concern Present    Feeling of Stress : Only a little   Social Connections: Unknown    Frequency of Communication with Friends and Family: More than three times a week    Frequency of Social Gatherings with Friends and Family: Once a week    Active Member of Clubs or Organizations: Yes    Attends Club or Organization Meetings: 1 to 4 times per year    Marital Status: Never    Housing Stability: Low Risk     Unable to Pay for Housing in the Last Year: No    Number of Places Lived in the Last  Year: 2    Unstable Housing in the Last Year: No     Patient Active Problem List   Diagnosis    Cyst of breast, left, solitary    Dysmenorrhea    Dyspareunia, female    Encounter for gynecological examination without abnormal finding    Menorrhagia with regular cycle    Pelvic pain in female    Vaginospasm    Vitamin D insufficiency    Depression with anxiety    Endometriosis determined by laparoscopy    ADHD (attention deficit hyperactivity disorder), inattentive type     Review of patient's allergies indicates:   Allergen Reactions    Penicillins Other (See Comments)     unknown       The following were reviewed at this visit: active problem list, medication list, allergies, family history, social history, and health maintenance.    Medications:  Current Outpatient Medications on File Prior to Visit   Medication Sig Dispense Refill    dextroamphetamine-amphetamine (ADDERALL XR) 20 MG 24 hr capsule Take 1 capsule (20 mg total) by mouth once daily. 30 capsule 0    ergocalciferol (ERGOCALCIFEROL) 50,000 unit Cap TAKE 1 CAPSULE BY MOUTH EVERY 7 DAYS FOR 12 DOSES (Patient not taking: Reported on 12/21/2022) 12 capsule 0     No current facility-administered medications on file prior to visit.       Medications have been reviewed and reconciled with patient at this visit.  Barriers to medications reviewed with patient.    Adverse reactions to current medications reviewed with patient..    Over the counter medications reviewed and reconciled with patient.    Exam:  Wt Readings from Last 3 Encounters:   09/12/22 94.7 kg (208 lb 10.7 oz)   06/20/22 93.4 kg (206 lb)   03/23/22 93.5 kg (206 lb 0.3 oz)     Temp Readings from Last 3 Encounters:   09/12/22 98.4 °F (36.9 °C) (Temporal)   06/20/22 98.4 °F (36.9 °C)   03/23/22 97.7 °F (36.5 °C) (Tympanic)     BP Readings from Last 3 Encounters:   09/12/22 110/80   06/20/22 125/85   03/23/22 110/70     Pulse Readings from Last 3 Encounters:   09/12/22 86   06/20/22 84   03/23/22 104      There is no height or weight on file to calculate BMI.      Review of Systems   HENT:  Positive for congestion and sore throat. Negative for ear discharge and ear pain.    Respiratory:  Positive for cough. Negative for shortness of breath and stridor.    Gastrointestinal:  Negative for abdominal pain, diarrhea and vomiting.   Musculoskeletal:  Negative for neck pain.   Neurological:  Positive for headaches.   Physical Exam  Nursing note reviewed.   Pulmonary:      Effort: Pulmonary effort is normal. No respiratory distress.   Neurological:      Mental Status: She is alert and oriented to person, place, and time.   Psychiatric:         Mood and Affect: Mood normal.         Behavior: Behavior normal.         Thought Content: Thought content normal.         Judgment: Judgment normal.       Laboratory Reviewed ({Yes)  Lab Results   Component Value Date    WBC 7.45 09/12/2022    HGB 12.4 09/12/2022    HCT 38.6 09/12/2022     09/12/2022    CHOL 200 (H) 08/21/2020    TRIG 62 08/21/2020    HDL 57 08/21/2020    ALT 12 08/21/2020    AST 16 08/21/2020     08/21/2020    K 4.0 08/21/2020     08/21/2020    CREATININE 0.8 08/21/2020    BUN 12 08/21/2020    CO2 25 08/21/2020    TSH 1.310 08/21/2020    HGBA1C 5.2 08/21/2020       Allyssa was seen today for uri.    Diagnoses and all orders for this visit:    Bronchitis  -     azithromycin (Z-RASHAWN) 250 MG tablet; Take 2 tablets by mouth on day 1; Take 1 tablet by mouth on days 2-5  -     methylPREDNISolone (MEDROL DOSEPACK) 4 mg tablet; use as directed                Care Plan/Goals: Reviewed    Goals    None         Follow up: Follow up if symptoms worsen or fail to improve.    After visit summary was printed and given to patient upon discharge today.  Patient goals and care plan are included in After Visit Summary.

## 2022-12-22 ENCOUNTER — PATIENT MESSAGE (OUTPATIENT)
Dept: INTERNAL MEDICINE | Facility: CLINIC | Age: 27
End: 2022-12-22
Payer: COMMERCIAL

## 2022-12-22 DIAGNOSIS — J40 BRONCHITIS: Primary | ICD-10-CM

## 2022-12-22 RX ORDER — ALBUTEROL SULFATE 90 UG/1
2 AEROSOL, METERED RESPIRATORY (INHALATION) EVERY 6 HOURS PRN
Qty: 18 G | Refills: 0 | Status: SHIPPED | OUTPATIENT
Start: 2022-12-22 | End: 2023-02-14

## 2023-01-22 DIAGNOSIS — F90.0 ADHD (ATTENTION DEFICIT HYPERACTIVITY DISORDER), INATTENTIVE TYPE: ICD-10-CM

## 2023-01-23 RX ORDER — DEXTROAMPHETAMINE SACCHARATE, AMPHETAMINE ASPARTATE MONOHYDRATE, DEXTROAMPHETAMINE SULFATE AND AMPHETAMINE SULFATE 5; 5; 5; 5 MG/1; MG/1; MG/1; MG/1
20 CAPSULE, EXTENDED RELEASE ORAL DAILY
Qty: 30 CAPSULE | Refills: 0 | Status: SHIPPED | OUTPATIENT
Start: 2023-01-23 | End: 2023-03-20 | Stop reason: SDUPTHER

## 2023-01-23 NOTE — TELEPHONE ENCOUNTER
No new care gaps identified.  [facility] Embedded Care Gaps. Reference number: [id number]. 1/22/2023   9:22:15 PM CST

## 2023-01-29 ENCOUNTER — PATIENT MESSAGE (OUTPATIENT)
Dept: INTERNAL MEDICINE | Facility: CLINIC | Age: 28
End: 2023-01-29
Payer: COMMERCIAL

## 2023-02-13 ENCOUNTER — TELEPHONE (OUTPATIENT)
Dept: INTERNAL MEDICINE | Facility: CLINIC | Age: 28
End: 2023-02-13
Payer: COMMERCIAL

## 2023-02-13 ENCOUNTER — PATIENT MESSAGE (OUTPATIENT)
Dept: INTERNAL MEDICINE | Facility: CLINIC | Age: 28
End: 2023-02-13
Payer: COMMERCIAL

## 2023-02-13 NOTE — TELEPHONE ENCOUNTER
Patient states she broke out with a rash possibly started last night / this morning . States the rash on her face is not really itchy but the one on her neck is itchy   Encouraged patient she can possibly take benadryl patient states she cant right now because she has test tomorrow morning. Virtual visit scheduled for tomorrow with corina blackman.           ----- Message from Rocio De Los Santos sent at 2/13/2023  2:18 PM CST -----  Contact: Self  Pt is asking for an return call in reference to having rash on face and neck that's burning and itching, please call back at .[phone] Thx CJ

## 2023-02-14 ENCOUNTER — PATIENT MESSAGE (OUTPATIENT)
Dept: INTERNAL MEDICINE | Facility: CLINIC | Age: 28
End: 2023-02-14

## 2023-02-14 ENCOUNTER — OFFICE VISIT (OUTPATIENT)
Dept: INTERNAL MEDICINE | Facility: CLINIC | Age: 28
End: 2023-02-14
Payer: COMMERCIAL

## 2023-02-14 VITALS — BODY MASS INDEX: 33.28 KG/M2 | WEIGHT: 200 LBS

## 2023-02-14 DIAGNOSIS — R21 RASH AND OTHER NONSPECIFIC SKIN ERUPTION: Primary | ICD-10-CM

## 2023-02-14 PROCEDURE — 1160F RVW MEDS BY RX/DR IN RCRD: CPT | Mod: CPTII,95,,

## 2023-02-14 PROCEDURE — 1159F MED LIST DOCD IN RCRD: CPT | Mod: CPTII,95,,

## 2023-02-14 PROCEDURE — 1159F PR MEDICATION LIST DOCUMENTED IN MEDICAL RECORD: ICD-10-PCS | Mod: CPTII,95,,

## 2023-02-14 PROCEDURE — 3008F BODY MASS INDEX DOCD: CPT | Mod: CPTII,95,,

## 2023-02-14 PROCEDURE — 99213 PR OFFICE/OUTPT VISIT, EST, LEVL III, 20-29 MIN: ICD-10-PCS | Mod: 95,,,

## 2023-02-14 PROCEDURE — 3008F PR BODY MASS INDEX (BMI) DOCUMENTED: ICD-10-PCS | Mod: CPTII,95,,

## 2023-02-14 PROCEDURE — 1160F PR REVIEW ALL MEDS BY PRESCRIBER/CLIN PHARMACIST DOCUMENTED: ICD-10-PCS | Mod: CPTII,95,,

## 2023-02-14 PROCEDURE — 99213 OFFICE O/P EST LOW 20 MIN: CPT | Mod: 95,,,

## 2023-02-14 RX ORDER — METHYLPREDNISOLONE 4 MG/1
TABLET ORAL
Qty: 21 EACH | Refills: 0 | Status: SHIPPED | OUTPATIENT
Start: 2023-02-14 | End: 2023-03-07

## 2023-02-14 RX ORDER — FAMOTIDINE 20 MG/1
20 TABLET, FILM COATED ORAL DAILY
Qty: 14 TABLET | Refills: 0 | Status: SHIPPED | OUTPATIENT
Start: 2023-02-14 | End: 2024-02-14

## 2023-02-14 NOTE — PROGRESS NOTES
Allyssa Leong  02/14/2023  [id number]    Diana Castaneda MD  Patient Care Team:  Diana Castaneda MD as PCP - General (Family Medicine)  Adalgisa Villatoro LPN as Care Coordinator          Visit Type:an urgent visit for a new problem    The patient location is: Home  The chief complaint leading to consultation is: RASH     Visit type: audiovisual    Face to Face time with patient: 9   minutes of total time spent on the encounter, which includes face to face time and non-face to face time preparing to see the patient (eg, review of tests), Obtaining and/or reviewing separately obtained history, Documenting clinical information in the electronic or other health record, Independently interpreting results (not separately reported) and communicating results to the patient/family/caregiver, or Care coordination (not separately reported).         Each patient to whom he or she provides medical services by telemedicine is:  (1) informed of the relationship between the physician and patient and the respective role of any other health care provider with respect to management of the patient; and (2) notified that he or she may decline to receive medical services by telemedicine and may withdraw from such care at any time.    Notes:      Has a red patch on her left check  States that he skin is flaky   Using hypoallergenic skin care and detergent     Noticed the rash 2 days ago  Tried hydrocortisone cream on her face and it caused a burning sensation   Could not use benadryl because it would make her too sleepy. Taking test for school     History:  Past Medical History:   Diagnosis Date    ADHD (attention deficit hyperactivity disorder), inattentive type 8/22/2022    Depression with anxiety 6/24/2020    Endometriosis      History reviewed. No pertinent surgical history.  Family History   Problem Relation Age of Onset    Breast cancer Mother     Cancer Father         [place] Sarcoma    Autism Brother     ADD / ADHD Brother       Social History     Socioeconomic History    Marital status: Single   Tobacco Use    Smoking status: Never    Smokeless tobacco: Never   Substance and Sexual Activity    Alcohol use: No     Social Determinants of Health     Financial Resource Strain: Low Risk     Difficulty of Paying Living Expenses: Not hard at all   Food Insecurity: No Food Insecurity    Worried About Running Out of Food in the Last Year: Never true    Ran Out of Food in the Last Year: Never true   Transportation Needs: No Transportation Needs    Lack of Transportation (Medical): No    Lack of Transportation (Non-Medical): No   Physical Activity: Sufficiently Active    Days of Exercise per Week: 4 days    Minutes of Exercise per Session: 60 min   Stress: Stress Concern Present    Feeling of Stress : To some extent   Social Connections: Unknown    Frequency of Communication with Friends and Family: More than three times a week    Frequency of Social Gatherings with Friends and Family: Once a week    Active Member of Clubs or Organizations: Yes    Attends Club or Organization Meetings: 1 to 4 times per year    Marital Status: Never    Housing Stability: Low Risk     Unable to Pay for Housing in the Last Year: No    Number of Places Lived in the Last Year: 2    Unstable Housing in the Last Year: No     Patient Active Problem List   Diagnosis    Cyst of breast, left, solitary    Dysmenorrhea    Dyspareunia, female    Encounter for gynecological examination without abnormal finding    Menorrhagia with regular cycle    Pelvic pain in female    Vaginospasm    Vitamin D insufficiency    Depression with anxiety    Endometriosis determined by laparoscopy    ADHD (attention deficit hyperactivity disorder), inattentive type     Review of patient's allergies indicates:   Allergen Reactions    Penicillins Other (See Comments)     unknown       The following were reviewed at this visit: active problem list, medication list, allergies, family history,  social history, and health maintenance.    Medications:  Current Outpatient Medications on File Prior to Visit   Medication Sig Dispense Refill    dextroamphetamine-amphetamine (ADDERALL XR) 20 MG 24 hr capsule Take 1 capsule (20 mg total) by mouth once daily. 30 capsule 0    ergocalciferol (ERGOCALCIFEROL) 50,000 unit Cap TAKE 1 CAPSULE BY MOUTH EVERY 7 DAYS FOR 12 DOSES 12 capsule 0    albuterol (PROVENTIL HFA) 90 mcg/actuation inhaler Inhale 2 puffs into the lungs every 6 (six) hours as needed for Wheezing. Rescue 18 g 0     No current facility-administered medications on file prior to visit.       Medications have been reviewed and reconciled with patient at this visit.  Barriers to medications reviewed with patient.    Adverse reactions to current medications reviewed with patient..    Over the counter medications reviewed and reconciled with patient.    Exam:  Wt Readings from Last 3 Encounters:   02/14/23 90.7 kg (200 lb)   09/12/22 94.7 kg (208 lb 10.7 oz)   06/20/22 93.4 kg (206 lb)     Temp Readings from Last 3 Encounters:   09/12/22 98.4 °F (36.9 °C) (Temporal)   06/20/22 98.4 °F (36.9 °C)   03/23/22 97.7 °F (36.5 °C) (Tympanic)     BP Readings from Last 3 Encounters:   09/12/22 110/80   06/20/22 125/85   03/23/22 110/70     Pulse Readings from Last 3 Encounters:   09/12/22 86   06/20/22 84   03/23/22 104     Body mass index is 33.28 kg/m².      Review of Systems   Constitutional:  Negative for fever.   HENT:  Negative for congestion and sore throat.    Eyes:  Negative for pain.   Respiratory:  Negative for cough and shortness of breath.    Gastrointestinal:  Negative for diarrhea and vomiting.   Musculoskeletal:  Negative for joint pain.   Skin:  Positive for rash.   Physical Exam  Nursing note reviewed.   Pulmonary:      Effort: Pulmonary effort is normal. No respiratory distress.   Neurological:      Mental Status: She is alert and oriented to person, place, and time.   Psychiatric:         Mood and  Affect: Mood normal.         Behavior: Behavior normal.         Thought Content: Thought content normal.         Judgment: Judgment normal.       Laboratory Reviewed ({Yes)  Lab Results   Component Value Date    WBC 7.45 09/12/2022    HGB 12.4 09/12/2022    HCT 38.6 09/12/2022     09/12/2022    CHOL 200 (H) 08/21/2020    TRIG 62 08/21/2020    HDL 57 08/21/2020    ALT 12 08/21/2020    AST 16 08/21/2020     08/21/2020    K 4.0 08/21/2020     08/21/2020    CREATININE 0.8 08/21/2020    BUN 12 08/21/2020    CO2 25 08/21/2020    TSH 1.310 08/21/2020    HGBA1C 5.2 08/21/2020       Diagnoses and all orders for this visit:    Rash and other nonspecific skin eruption  -     Ambulatory referral/consult to Dermatology; Future  -     methylPREDNISolone (MEDROL DOSEPACK) 4 mg tablet; use as directed  -     famotidine (PEPCID) 20 MG tablet; Take 1 tablet (20 mg total) by mouth once daily.    Will try taking benadryl at night   Send in steroid dose pack and Pepcid to pharmacy     Care Plan/Goals: Reviewed    Goals    None         Follow up: No follow-ups on file.    After visit summary was printed and given to patient upon discharge today.  Patient goals and care plan are included in After Visit Summary.    Answers submitted by the patient for this visit:  Rash Questionnaire (Submitted on 2/14/2023)  Chief Complaint: Rash  Chronicity: new  Onset: yesterday  Progression since onset: gradually improving  Affected locations: face, neck  Characteristics: burning, dryness, redness, itchiness  Exposed to: nothing  anorexia: No  facial edema: No  fatigue: No  nail changes: No  rhinorrhea: No  Treatments tried: anti-itch cream, cold compress, topical steroids  Improvement on treatment: no relief  asthma: No  allergies: Yes  eczema: No  varicella: No

## 2023-03-20 DIAGNOSIS — F90.0 ADHD (ATTENTION DEFICIT HYPERACTIVITY DISORDER), INATTENTIVE TYPE: ICD-10-CM

## 2023-03-20 RX ORDER — DEXTROAMPHETAMINE SACCHARATE, AMPHETAMINE ASPARTATE MONOHYDRATE, DEXTROAMPHETAMINE SULFATE AND AMPHETAMINE SULFATE 5; 5; 5; 5 MG/1; MG/1; MG/1; MG/1
20 CAPSULE, EXTENDED RELEASE ORAL DAILY
Qty: 30 CAPSULE | Refills: 0 | Status: SHIPPED | OUTPATIENT
Start: 2023-03-20 | End: 2023-05-02 | Stop reason: SDUPTHER

## 2023-03-20 NOTE — TELEPHONE ENCOUNTER
No new care gaps identified.  [facility] Embedded Care Gaps. Reference number: [id number]. 3/20/2023   6:17:04 AM CDT

## 2023-03-27 ENCOUNTER — PATIENT MESSAGE (OUTPATIENT)
Dept: INTERNAL MEDICINE | Facility: CLINIC | Age: 28
End: 2023-03-27
Payer: COMMERCIAL

## 2023-03-27 ENCOUNTER — TELEPHONE (OUTPATIENT)
Dept: FAMILY MEDICINE | Facility: CLINIC | Age: 28
End: 2023-03-27
Payer: COMMERCIAL

## 2023-03-27 ENCOUNTER — PATIENT OUTREACH (OUTPATIENT)
Dept: ADMINISTRATIVE | Facility: HOSPITAL | Age: 28
End: 2023-03-27
Payer: COMMERCIAL

## 2023-03-27 DIAGNOSIS — F90.0 ADHD (ATTENTION DEFICIT HYPERACTIVITY DISORDER), INATTENTIVE TYPE: ICD-10-CM

## 2023-03-27 RX ORDER — DEXTROAMPHETAMINE SACCHARATE, AMPHETAMINE ASPARTATE, DEXTROAMPHETAMINE SULFATE AND AMPHETAMINE SULFATE 5; 5; 5; 5 MG/1; MG/1; MG/1; MG/1
1 TABLET ORAL DAILY
Qty: 30 TABLET | Refills: 0 | Status: SHIPPED | OUTPATIENT
Start: 2023-03-27 | End: 2023-12-04

## 2023-03-27 NOTE — TELEPHONE ENCOUNTER
----- Message from Diana Castaneda MD sent at 3/27/2023  9:06 AM CDT -----  Contact: Allyssa  Please call patient  ASk her if she is in [facility] or [place].  We can try to use an [facility] pharmacy      ----- Message -----  From: Rachael Nava MA  Sent: 3/27/2023   8:50 AM CDT  To: Diana Castaneda MD      ----- Message -----  From: Umesh Potts  Sent: 3/27/2023   8:44 AM CDT  To: Leonel Ortiz Staff    Allyssa is calling in regards to dextroamphetamine-amphetamine (ADDERALL XR) 20 MG 24 hr capsule. Patient states she called every pharmacy and they are currently out of stock. Please call her back at [phone]        Thanks  CF

## 2023-03-29 ENCOUNTER — PATIENT OUTREACH (OUTPATIENT)
Dept: ADMINISTRATIVE | Facility: HOSPITAL | Age: 28
End: 2023-03-29
Payer: COMMERCIAL

## 2023-03-29 NOTE — LETTER
AUTHORIZATION FOR RELEASE OF   CONFIDENTIAL INFORMATION    Dear Mary Ann Howard MD,    We are seeing Allysas Leong, date of birth 1995, in the clinic at [facility]. Diana Castaneda MD is the patient's PCP. Allyssa Leong has an outstanding lab/procedure at the time we reviewed her chart. In order to help keep her health information updated, she has authorized us to request the following medical record(s):        (  )  MAMMOGRAM                                      (  )  COLONOSCOPY      (  )  PAP SMEAR                                          (  )  OUTSIDE LAB RESULTS     (  )  DEXA SCAN                                          (  )  EYE EXAM            (  )  FOOT EXAM                                          (  )  ENTIRE RECORD     (  )  OUTSIDE IMMUNIZATIONS                 (x)  Please send note indicating when pt     should repeat colon._______________         Please fax records to [facility], Tiffany L Davis, MD, [phone].     If you have any questions, please contact   Felicitas SUMMERS LPN   Care Coordination   [facility]  Phone [phone]      Patient Name: Allyssa Leong  : 1995  MRN [id number]  Patient Phone #: [phone]

## 2023-04-06 NOTE — PROGRESS NOTES
Manually uploaded COLON PATHOLOGY 3/17/23 to media. Sent to [facility] for repeat recommendation.

## 2023-04-08 ENCOUNTER — PATIENT MESSAGE (OUTPATIENT)
Dept: INTERNAL MEDICINE | Facility: CLINIC | Age: 28
End: 2023-04-08
Payer: COMMERCIAL

## 2023-04-11 ENCOUNTER — PATIENT MESSAGE (OUTPATIENT)
Dept: INTERNAL MEDICINE | Facility: CLINIC | Age: 28
End: 2023-04-11
Payer: COMMERCIAL

## 2023-04-12 ENCOUNTER — PATIENT MESSAGE (OUTPATIENT)
Dept: INTERNAL MEDICINE | Facility: CLINIC | Age: 28
End: 2023-04-12
Payer: COMMERCIAL

## 2023-04-18 ENCOUNTER — PATIENT MESSAGE (OUTPATIENT)
Dept: INTERNAL MEDICINE | Facility: CLINIC | Age: 28
End: 2023-04-18
Payer: COMMERCIAL

## 2023-04-18 NOTE — TELEPHONE ENCOUNTER
Patient needs a script for malaria prophylactic she is going out of the [place]. Can this be done virtually or does it need to be in office?

## 2023-04-21 ENCOUNTER — OFFICE VISIT (OUTPATIENT)
Dept: INTERNAL MEDICINE | Facility: CLINIC | Age: 28
End: 2023-04-21
Payer: COMMERCIAL

## 2023-04-21 VITALS — WEIGHT: 190 LBS | BODY MASS INDEX: 31.62 KG/M2

## 2023-04-21 DIAGNOSIS — Z71.84 ENCOUNTER FOR HEALTH COUNSELING RELATED TO TRAVEL: Primary | ICD-10-CM

## 2023-04-21 DIAGNOSIS — F90.0 ADHD (ATTENTION DEFICIT HYPERACTIVITY DISORDER), INATTENTIVE TYPE: ICD-10-CM

## 2023-04-21 DIAGNOSIS — Z29.89 NEED FOR MALARIA PROPHYLAXIS: ICD-10-CM

## 2023-04-21 PROCEDURE — 99214 PR OFFICE/OUTPT VISIT, EST, LEVL IV, 30-39 MIN: ICD-10-PCS | Mod: 95,,,

## 2023-04-21 PROCEDURE — 99214 OFFICE O/P EST MOD 30 MIN: CPT | Mod: 95,,,

## 2023-04-21 PROCEDURE — 1160F PR REVIEW ALL MEDS BY PRESCRIBER/CLIN PHARMACIST DOCUMENTED: ICD-10-PCS | Mod: CPTII,95,,

## 2023-04-21 PROCEDURE — 1159F MED LIST DOCD IN RCRD: CPT | Mod: CPTII,95,,

## 2023-04-21 PROCEDURE — 1160F RVW MEDS BY RX/DR IN RCRD: CPT | Mod: CPTII,95,,

## 2023-04-21 PROCEDURE — 3008F BODY MASS INDEX DOCD: CPT | Mod: CPTII,95,,

## 2023-04-21 PROCEDURE — 3008F PR BODY MASS INDEX (BMI) DOCUMENTED: ICD-10-PCS | Mod: CPTII,95,,

## 2023-04-21 PROCEDURE — 1159F PR MEDICATION LIST DOCUMENTED IN MEDICAL RECORD: ICD-10-PCS | Mod: CPTII,95,,

## 2023-04-21 RX ORDER — MEFLOQUINE HYDROCHLORIDE 250 MG/1
250 TABLET ORAL
Qty: 5 TABLET | Refills: 0 | Status: SHIPPED | OUTPATIENT
Start: 2023-04-21

## 2023-04-21 NOTE — PROGRESS NOTES
Allyssa Leong  04/21/2023  [id number]    Diana Castaneda MD  Patient Care Team:  Diana Castaneda MD as PCP - General (Family Medicine)  Adalgisa Villatoro LPN as Care Coordinator  Mary Ann Howard MD as Consulting Physician (Gastroenterology)          Visit Type:an urgent visit for a new problem    The patient location is: Home  The chief complaint leading to consultation is:   Malaria prophylaxis     Visit type: audiovisual    Face to Face time with patient: 33   minutes of total time spent on the encounter, which includes face to face time and non-face to face time preparing to see the patient (eg, review of tests), Obtaining and/or reviewing separately obtained history, Documenting clinical information in the electronic or other health record, Independently interpreting results (not separately reported) and communicating results to the patient/family/caregiver, or Care coordination (not separately reported).         Each patient to whom he or she provides medical services by telemedicine is:  (1) informed of the relationship between the physician and patient and the respective role of any other health care provider with respect to management of the patient; and (2) notified that he or she may decline to receive medical services by telemedicine and may withdraw from such care at any time.    Notes:      27 year old female presents today to discuss malaria prophylaxis  She will be traveling to [place] in June   She will be there for 3 weeks   She is in [employer]  She is not pregnant and or breast feeding     ADHD  She feels that the IR is not working for her   She last had the medicine filled 3/27  Would like to switch back to ER     History:  Past Medical History:   Diagnosis Date    ADHD (attention deficit hyperactivity disorder), inattentive type 8/22/2022    Depression with anxiety 6/24/2020    Endometriosis      No past surgical history on file.  Family History   Problem Relation Age of Onset     Breast cancer Mother     Cancer Father         [place] Sarcoma    Autism Brother     ADD / ADHD Brother      Social History     Socioeconomic History    Marital status: Single   Tobacco Use    Smoking status: Never    Smokeless tobacco: Never   Substance and Sexual Activity    Alcohol use: No     Social Determinants of Health     Financial Resource Strain: Low Risk     Difficulty of Paying Living Expenses: Not hard at all   Food Insecurity: No Food Insecurity    Worried About Running Out of Food in the Last Year: Never true    Ran Out of Food in the Last Year: Never true   Transportation Needs: No Transportation Needs    Lack of Transportation (Medical): No    Lack of Transportation (Non-Medical): No   Physical Activity: Sufficiently Active    Days of Exercise per Week: 4 days    Minutes of Exercise per Session: 60 min   Stress: Stress Concern Present    Feeling of Stress : To some extent   Social Connections: Unknown    Frequency of Communication with Friends and Family: More than three times a week    Frequency of Social Gatherings with Friends and Family: Once a week    Active Member of Clubs or Organizations: Yes    Attends Club or Organization Meetings: 1 to 4 times per year    Marital Status: Never    Housing Stability: Unknown    Unable to Pay for Housing in the Last Year: No    Unstable Housing in the Last Year: No     Patient Active Problem List   Diagnosis    Cyst of breast, left, solitary    Dysmenorrhea    Dyspareunia, female    Encounter for gynecological examination without abnormal finding    Menorrhagia with regular cycle    Pelvic pain in female    Vaginospasm    Vitamin D insufficiency    Depression with anxiety    Endometriosis determined by laparoscopy    ADHD (attention deficit hyperactivity disorder), inattentive type     Review of patient's allergies indicates:   Allergen Reactions    Penicillins Other (See Comments) and Hives     unknown       The following were reviewed at this visit:  active problem list, medication list, allergies, family history, social history, and health maintenance.    Medications:  Current Outpatient Medications on File Prior to Visit   Medication Sig Dispense Refill    dextroamphetamine-amphetamine (ADDERALL XR) 20 MG 24 hr capsule Take 1 capsule (20 mg total) by mouth once daily. 30 capsule 0    dextroamphetamine-amphetamine (ADDERALL) 20 mg tablet Take 1 tablet by mouth once daily. 30 tablet 0    ergocalciferol (ERGOCALCIFEROL) 50,000 unit Cap TAKE 1 CAPSULE BY MOUTH EVERY 7 DAYS FOR 12 DOSES 12 capsule 0    famotidine (PEPCID) 20 MG tablet Take 1 tablet (20 mg total) by mouth once daily. 14 tablet 0     No current facility-administered medications on file prior to visit.       Medications have been reviewed and reconciled with patient at this visit.  Barriers to medications reviewed with patient.    Adverse reactions to current medications reviewed with patient..    Over the counter medications reviewed and reconciled with patient.    Exam:  Wt Readings from Last 3 Encounters:   02/14/23 90.7 kg (200 lb)   09/12/22 94.7 kg (208 lb 10.7 oz)   06/20/22 93.4 kg (206 lb)     Temp Readings from Last 3 Encounters:   09/12/22 98.4 °F (36.9 °C) (Temporal)   06/20/22 98.4 °F (36.9 °C)   03/23/22 97.7 °F (36.5 °C) (Tympanic)     BP Readings from Last 3 Encounters:   09/12/22 110/80   06/20/22 125/85   03/23/22 110/70     Pulse Readings from Last 3 Encounters:   09/12/22 86   06/20/22 84   03/23/22 104     There is no height or weight on file to calculate BMI.      Review of Systems   HENT:  Negative for hearing loss.    Eyes:  Negative for discharge.   Respiratory:  Negative for wheezing.    Cardiovascular:  Negative for chest pain and palpitations.   Gastrointestinal:  Negative for blood in stool, constipation, diarrhea and vomiting.   Genitourinary:  Negative for dysuria and hematuria.   Musculoskeletal:  Negative for neck pain.   Neurological:  Negative for weakness and  headaches.   Endo/Heme/Allergies:  Negative for polydipsia.   Answers submitted by the patient for this visit:  Review of Systems Questionnaire (Submitted on 4/21/2023)  activity change: No  unexpected weight change: No  rhinorrhea: No  trouble swallowing: No  visual disturbance: No  chest tightness: No  polyuria: No  difficulty urinating: No  menstrual problem: No  joint swelling: No  arthralgias: No  confusion: No  dysphoric mood: No    Physical Exam  Nursing note reviewed.   Pulmonary:      Effort: Pulmonary effort is normal. No respiratory distress.   Neurological:      Mental Status: She is alert and oriented to person, place, and time.   Psychiatric:         Mood and Affect: Mood normal.         Behavior: Behavior normal.         Thought Content: Thought content normal.         Judgment: Judgment normal.       Laboratory Reviewed ({Yes)  Lab Results   Component Value Date    WBC 7.45 09/12/2022    HGB 12.4 09/12/2022    HCT 38.6 09/12/2022     09/12/2022    CHOL 200 (H) 08/21/2020    TRIG 62 08/21/2020    HDL 57 08/21/2020    ALT 12 08/21/2020    AST 16 08/21/2020     08/21/2020    K 4.0 08/21/2020     08/21/2020    CREATININE 0.8 08/21/2020    BUN 12 08/21/2020    CO2 25 08/21/2020    TSH 1.310 08/21/2020    HGBA1C 5.2 08/21/2020       Diagnoses and all orders for this visit:    Encounter for health counseling related to travel  -     mefloquine (LARIAM) 250 mg tablet; Take 1 tablet (250 mg total) by mouth every 7 days.    Need for malaria prophylaxis  -     mefloquine (LARIAM) 250 mg tablet; Take 1 tablet (250 mg total) by mouth every 7 days.    ADHD (attention deficit hyperactivity disorder), inattentive type      Discussed how to take the med  Has an appt with [facility] for needed vaccines    Will send a [employer] message to send Adderall ER when she is back in [place]     Atovaquone-proguanil is administered daily with food beginning one to two days prior to exposure, during exposure,  and for one week following exposure. The drug is well tolerated, with excellent profiles of safety and efficacy.  Adverse effects may include gastrointestinal upset, insomnia, headache, rash, and mouth ulcers. Atovaquone-proguanil is contraindicated in patients with creatinine clearance <30 mL per minute, and it is not recommended for use in pregnant patients, infants weighing <5 kg, or women breastfeeding infants weighing <5 kg due to insufficient safety data.       Care Plan/Goals: Reviewed    Goals    None         Follow up: No follow-ups on file.    After visit summary was printed and given to patient upon discharge today.  Patient goals and care plan are included in After Visit Summary.

## 2023-04-26 NOTE — PROGRESS NOTES
LM with Dr Mary Ann Howard MD office requesting a note indicating if/when colon needs to be repeated.

## 2023-05-24 ENCOUNTER — PATIENT MESSAGE (OUTPATIENT)
Dept: INTERNAL MEDICINE | Facility: CLINIC | Age: 28
End: 2023-05-24
Payer: COMMERCIAL

## 2023-07-31 DIAGNOSIS — F90.0 ADHD (ATTENTION DEFICIT HYPERACTIVITY DISORDER), INATTENTIVE TYPE: ICD-10-CM

## 2023-08-01 RX ORDER — DEXTROAMPHETAMINE SACCHARATE, AMPHETAMINE ASPARTATE MONOHYDRATE, DEXTROAMPHETAMINE SULFATE AND AMPHETAMINE SULFATE 5; 5; 5; 5 MG/1; MG/1; MG/1; MG/1
20 CAPSULE, EXTENDED RELEASE ORAL DAILY
Qty: 30 CAPSULE | Refills: 0 | Status: SHIPPED | OUTPATIENT
Start: 2023-08-01 | End: 2023-08-03 | Stop reason: SDUPTHER

## 2023-08-01 NOTE — TELEPHONE ENCOUNTER
Refill Routing Note   Medication(s) are not appropriate for processing by [facility] for the following reason(s):      Medication outside of protocol    [facility] action(s):  Route Care Due:  None identified            Appointments  past 12m or future 3m with PCP    Date Provider   Last Visit   12/21/2022 Diana Castaneda MD   Next Visit   Visit date not found Diana Castaneda MD   ED visits in past 90 days: 0        Note composed:9:28 AM 08/01/2023

## 2023-08-01 NOTE — TELEPHONE ENCOUNTER
No care due was identified.  [facility] Embedded Care Due Messages. Reference number: [id number].   7/31/2023 8:20:25 PM CDT

## 2023-08-03 ENCOUNTER — TELEPHONE (OUTPATIENT)
Dept: INTERNAL MEDICINE | Facility: CLINIC | Age: 28
End: 2023-08-03
Payer: COMMERCIAL

## 2023-08-03 RX ORDER — DEXTROAMPHETAMINE SACCHARATE, AMPHETAMINE ASPARTATE MONOHYDRATE, DEXTROAMPHETAMINE SULFATE AND AMPHETAMINE SULFATE 5; 5; 5; 5 MG/1; MG/1; MG/1; MG/1
20 CAPSULE, EXTENDED RELEASE ORAL DAILY
Qty: 30 CAPSULE | Refills: 0 | Status: SHIPPED | OUTPATIENT
Start: 2023-08-03 | End: 2023-11-03 | Stop reason: SDUPTHER

## 2023-08-03 NOTE — TELEPHONE ENCOUNTER
----- Message from Patricia Beasley sent at 8/3/2023  2:44 PM CDT -----  Patient is requesting the Adderall prescription be sent to the [facility] pharmacy due to It being out of stock at the other location. Call back at [phone]

## 2023-11-01 ENCOUNTER — PATIENT MESSAGE (OUTPATIENT)
Dept: INTERNAL MEDICINE | Facility: CLINIC | Age: 28
End: 2023-11-01
Payer: COMMERCIAL

## 2023-11-03 ENCOUNTER — OFFICE VISIT (OUTPATIENT)
Dept: INTERNAL MEDICINE | Facility: CLINIC | Age: 28
End: 2023-11-03
Payer: COMMERCIAL

## 2023-11-03 ENCOUNTER — LAB VISIT (OUTPATIENT)
Dept: LAB | Facility: HOSPITAL | Age: 28
End: 2023-11-03
Payer: COMMERCIAL

## 2023-11-03 VITALS
OXYGEN SATURATION: 98 % | TEMPERATURE: 99 F | DIASTOLIC BLOOD PRESSURE: 80 MMHG | SYSTOLIC BLOOD PRESSURE: 114 MMHG | WEIGHT: 210.75 LBS | HEART RATE: 94 BPM | BODY MASS INDEX: 35.11 KG/M2 | HEIGHT: 65 IN

## 2023-11-03 DIAGNOSIS — E66.9 OBESITY (BMI 30-39.9): ICD-10-CM

## 2023-11-03 DIAGNOSIS — F90.0 ADHD (ATTENTION DEFICIT HYPERACTIVITY DISORDER), INATTENTIVE TYPE: ICD-10-CM

## 2023-11-03 DIAGNOSIS — Z00.00 ANNUAL PHYSICAL EXAM: ICD-10-CM

## 2023-11-03 DIAGNOSIS — Z00.00 ANNUAL PHYSICAL EXAM: Primary | ICD-10-CM

## 2023-11-03 DIAGNOSIS — F90.0 ATTENTION DEFICIT HYPERACTIVITY DISORDER (ADHD), PREDOMINANTLY INATTENTIVE TYPE: ICD-10-CM

## 2023-11-03 PROCEDURE — 80053 COMPREHEN METABOLIC PANEL: CPT

## 2023-11-03 PROCEDURE — 80061 LIPID PANEL: CPT

## 2023-11-03 PROCEDURE — 99395 PREV VISIT EST AGE 18-39: CPT | Mod: S$PBB,,,

## 2023-11-03 PROCEDURE — 36415 COLL VENOUS BLD VENIPUNCTURE: CPT

## 2023-11-03 PROCEDURE — 99999 PR PBB SHADOW E&M-EST. PATIENT-LVL III: ICD-10-PCS | Mod: PBBFAC,,,

## 2023-11-03 PROCEDURE — 84443 ASSAY THYROID STIM HORMONE: CPT

## 2023-11-03 PROCEDURE — 85025 COMPLETE CBC W/AUTO DIFF WBC: CPT

## 2023-11-03 PROCEDURE — 99999 PR PBB SHADOW E&M-EST. PATIENT-LVL III: CPT | Mod: PBBFAC,,,

## 2023-11-03 PROCEDURE — 99395 PR PREVENTIVE VISIT,EST,18-39: ICD-10-PCS | Mod: S$PBB,,,

## 2023-11-03 RX ORDER — DEXTROAMPHETAMINE SACCHARATE, AMPHETAMINE ASPARTATE MONOHYDRATE, DEXTROAMPHETAMINE SULFATE AND AMPHETAMINE SULFATE 5; 5; 5; 5 MG/1; MG/1; MG/1; MG/1
20 CAPSULE, EXTENDED RELEASE ORAL DAILY
Qty: 30 CAPSULE | Refills: 0 | Status: SHIPPED | OUTPATIENT
Start: 2023-11-03 | End: 2023-12-03 | Stop reason: SDUPTHER

## 2023-11-03 NOTE — PROGRESS NOTES
Allyssa Leong  11/03/2023  [id number]    Diana Castaneda MD  Patient Care Team:  Diana Castaneda MD as PCP - General (Family Medicine)  Adalgisa Villatoro LPN as Care Coordinator  Guider, MD Mary Ann as Consulting Physician (Gastroenterology)          Visit Type:a scheduled routine follow-up visit    Chief Complaint:  Chief Complaint   Patient presents with    Annual Exam    Medication Refill       History of Present Illness:    28 year old female presents today for an annual and med refill  Having pain in left jaw behind her ear  No recent dental work      The patient presents today for ADD/ADHD med check. The patient is currently taking Adderall XR 20 mg . According to the  the medication was last filled on August 2023 The patient feels that medication is currently working for them. The patient is able to concentrate with the current dose of medication. The patient denies Chest Pain, problems with insomnia, or severely decreased appetite. The patient is able to tolerate the medication without other side effects. The patient takes the medication daily.      History:  Past Medical History:   Diagnosis Date    ADHD (attention deficit hyperactivity disorder), inattentive type 8/22/2022    Depression with anxiety 6/24/2020    Endometriosis      History reviewed. No pertinent surgical history.  Family History   Problem Relation Age of Onset    Breast cancer Mother     Cancer Father         K[place]osi Sarcoma    Autism Brother     ADD / ADHD Brother      Social History     Socioeconomic History    Marital status: Single   Tobacco Use    Smoking status: Never    Smokeless tobacco: Never   Substance and Sexual Activity    Alcohol use: No     Social Determinants of Health     Financial Resource Strain: Low Risk  (11/3/2023)    Overall Financial Resource Strain (CARDIA)     Difficulty of Paying Living Expenses: Not hard at all   Food Insecurity: No Food Insecurity (11/3/2023)    Hunger Vital Sign     Worried About  Running Out of Food in the Last Year: Never true     Ran Out of Food in the Last Year: Never true   Transportation Needs: No Transportation Needs (11/3/2023)    PRAPARE - Transportation     Lack of Transportation (Medical): No     Lack of Transportation (Non-Medical): No   Physical Activity: Sufficiently Active (11/3/2023)    Exercise Vital Sign     Days of Exercise per Week: 3 days     Minutes of Exercise per Session: 60 min   Stress: Stress Concern Present (11/3/2023)    [employer] - Occupational Stress Questionnaire     Feeling of Stress : Rather much   Social Connections: Unknown (11/3/2023)    Social Connection and Isolation Panel [NHANES]     Frequency of Communication with Friends and Family: More than three times a week     Frequency of Social Gatherings with Friends and Family: More than three times a week     Active Member of Clubs or Organizations: Yes     Attends Club or Organization Meetings: More than 4 times per year     Marital Status: Never    Housing Stability: Low Risk  (11/3/2023)    Housing Stability Vital Sign     Unable to Pay for Housing in the Last Year: No     Number of Places Lived in the Last Year: 2     Unstable Housing in the Last Year: No     Patient Active Problem List   Diagnosis    Cyst of breast, left, solitary    Dysmenorrhea    Dyspareunia, female    Encounter for gynecological examination without abnormal finding    Menorrhagia with regular cycle    Pelvic pain in female    Vaginospasm    Vitamin D insufficiency    Depression with anxiety    Endometriosis determined by laparoscopy    ADHD (attention deficit hyperactivity disorder), inattentive type     Review of patient's allergies indicates:   Allergen Reactions    Penicillins Other (See Comments) and Hives     unknown       The following were reviewed at this visit: active problem list, medication list, allergies, family history, social history, and health  maintenance.    Medications:  Current Outpatient Medications on File Prior to Visit   Medication Sig Dispense Refill    dextroamphetamine-amphetamine (ADDERALL XR) 20 MG 24 hr capsule Take 1 capsule (20 mg total) by mouth once daily. 30 capsule 0    dextroamphetamine-amphetamine (ADDERALL) 20 mg tablet Take 1 tablet by mouth once daily. 30 tablet 0    ergocalciferol (ERGOCALCIFEROL) 50,000 unit Cap TAKE 1 CAPSULE BY MOUTH EVERY 7 DAYS FOR 12 DOSES 12 capsule 0    famotidine (PEPCID) 20 MG tablet Take 1 tablet (20 mg total) by mouth once daily. 14 tablet 0    mefloquine (LARIAM) 250 mg tablet Take 1 tablet (250 mg total) by mouth every 7 days. 5 tablet 0     No current facility-administered medications on file prior to visit.       Medications have been reviewed and reconciled with patient at this visit.  Barriers to medications reviewed with patient.    Adverse reactions to current medications reviewed with patient..    Over the counter medications reviewed and reconciled with patient.    Exam:  Wt Readings from Last 3 Encounters:   11/03/23 95.6 kg (210 lb 12.2 oz)   04/21/23 86.2 kg (190 lb)   02/14/23 90.7 kg (200 lb)     Temp Readings from Last 3 Encounters:   11/03/23 98.8 °F (37.1 °C) (Tympanic)   09/12/22 98.4 °F (36.9 °C) (Temporal)   06/20/22 98.4 °F (36.9 °C)     BP Readings from Last 3 Encounters:   11/03/23 114/80   09/12/22 110/80   06/20/22 125/85     Pulse Readings from Last 3 Encounters:   11/03/23 94   09/12/22 86   06/20/22 84     Body mass index is 35.07 kg/m².      Review of Systems   HENT:  Negative for hearing loss.    Eyes:  Negative for discharge.   Respiratory:  Negative for wheezing.    Cardiovascular:  Negative for chest pain and palpitations.   Gastrointestinal:  Negative for constipation, diarrhea and vomiting.   Genitourinary:  Negative for hematuria.   Neurological:  Negative for headaches.     Answers submitted by the patient for this visit:  Review of Systems Questionnaire (Submitted  on 11/1/2023)  activity change: No  trouble swallowing: No  chest tightness: No  difficulty urinating: No  dysphoric mood: No      Physical Exam  Vitals and nursing note reviewed.   Constitutional:       General: She is not in acute distress.     Appearance: She is well-developed. She is obese. She is not diaphoretic.   HENT:      Head: Normocephalic and atraumatic.      Right Ear: External ear normal.      Left Ear: External ear normal.   Eyes:      General:         Right eye: No discharge.         Left eye: No discharge.      Conjunctiva/sclera: Conjunctivae normal.      Pupils: Pupils are equal, round, and reactive to light.   Neck:      Thyroid: No thyroid mass, thyromegaly or thyroid tenderness.   Cardiovascular:      Rate and Rhythm: Normal rate and regular rhythm.      Heart sounds: Normal heart sounds. No murmur heard.  Pulmonary:      Effort: Pulmonary effort is normal. No respiratory distress.      Breath sounds: Normal breath sounds. No wheezing.   Musculoskeletal:      Cervical back: No tenderness.   Neurological:      Mental Status: She is alert and oriented to person, place, and time.   Psychiatric:         Behavior: Behavior normal.         Thought Content: Thought content normal.         Judgment: Judgment normal.         Laboratory Reviewed ({Yes)  Lab Results   Component Value Date    WBC 7.45 09/12/2022    HGB 12.4 09/12/2022    HCT 38.6 09/12/2022     09/12/2022    CHOL 200 (H) 08/21/2020    TRIG 62 08/21/2020    HDL 57 08/21/2020    ALT 12 08/21/2020    AST 16 08/21/2020     08/21/2020    K 4.0 08/21/2020     08/21/2020    CREATININE 0.8 08/21/2020    BUN 12 08/21/2020    CO2 25 08/21/2020    TSH 1.310 08/21/2020    HGBA1C 5.2 08/21/2020         Allyssa was seen today for annual exam and medication refill.    Diagnoses and all orders for this visit:    Annual physical exam  -     TSH; Future  -     CBC Auto Differential; Future  -     Lipid Panel; Future  -     Comprehensive  Metabolic Panel; Future    ADHD (attention deficit hyperactivity disorder), inattentive type  -     dextroamphetamine-amphetamine (ADDERALL XR) 20 MG 24 hr capsule; Take 1 capsule (20 mg total) by mouth once daily.    Attention deficit hyperactivity disorder (ADHD), predominantly inattentive type  -     dextroamphetamine-amphetamine (ADDERALL XR) 20 MG 24 hr capsule; Take 1 capsule (20 mg total) by mouth once daily.    Obesity (BMI 30-39.9)  -     CBC Auto Differential; Future  -     Lipid Panel; Future  -     Comprehensive Metabolic Panel; Future      Will schedule a follow up exam with Dr. Castaneda in 3 months virtually       Care Plan/Goals: Reviewed    Goals    None         Follow up: No follow-ups on file.    After visit summary was printed and given to patient upon discharge today.  Patient goals and care plan are included in After Visit Summary.

## 2023-11-04 LAB
ALBUMIN SERPL BCP-MCNC: 4.3 G/DL (ref 3.5–5.2)
ALP SERPL-CCNC: 68 U/L (ref 55–135)
ALT SERPL W/O P-5'-P-CCNC: 15 U/L (ref 10–44)
ANION GAP SERPL CALC-SCNC: 9 MMOL/L (ref 8–16)
AST SERPL-CCNC: 17 U/L (ref 10–40)
BASOPHILS # BLD AUTO: 0.04 K/UL (ref 0–0.2)
BASOPHILS NFR BLD: 0.4 % (ref 0–1.9)
BILIRUB SERPL-MCNC: 0.8 MG/DL (ref 0.1–1)
BUN SERPL-MCNC: 13 MG/DL (ref 6–20)
CALCIUM SERPL-MCNC: 9.7 MG/DL (ref 8.7–10.5)
CHLORIDE SERPL-SCNC: 107 MMOL/L (ref 95–110)
CHOLEST SERPL-MCNC: 189 MG/DL (ref 120–199)
CHOLEST/HDLC SERPL: 3.7 {RATIO} (ref 2–5)
CO2 SERPL-SCNC: 25 MMOL/L (ref 23–29)
CREAT SERPL-MCNC: 0.8 MG/DL (ref 0.5–1.4)
DIFFERENTIAL METHOD: ABNORMAL
EOSINOPHIL # BLD AUTO: 0.1 K/UL (ref 0–0.5)
EOSINOPHIL NFR BLD: 0.9 % (ref 0–8)
ERYTHROCYTE [DISTWIDTH] IN BLOOD BY AUTOMATED COUNT: 13.9 % (ref 11.5–14.5)
EST. GFR  (NO RACE VARIABLE): >60 ML/MIN/1.73 M^2
GLUCOSE SERPL-MCNC: 85 MG/DL (ref 70–110)
HCT VFR BLD AUTO: 39.9 % (ref 37–48.5)
HDLC SERPL-MCNC: 51 MG/DL (ref 40–75)
HDLC SERPL: 27 % (ref 20–50)
HGB BLD-MCNC: 12.2 G/DL (ref 12–16)
IMM GRANULOCYTES # BLD AUTO: 0.02 K/UL (ref 0–0.04)
IMM GRANULOCYTES NFR BLD AUTO: 0.2 % (ref 0–0.5)
LDLC SERPL CALC-MCNC: 122.2 MG/DL (ref 63–159)
LYMPHOCYTES # BLD AUTO: 2 K/UL (ref 1–4.8)
LYMPHOCYTES NFR BLD: 22.8 % (ref 18–48)
MCH RBC QN AUTO: 24.3 PG (ref 27–31)
MCHC RBC AUTO-ENTMCNC: 30.6 G/DL (ref 32–36)
MCV RBC AUTO: 80 FL (ref 82–98)
MONOCYTES # BLD AUTO: 0.5 K/UL (ref 0.3–1)
MONOCYTES NFR BLD: 5.7 % (ref 4–15)
NEUTROPHILS # BLD AUTO: 6.2 K/UL (ref 1.8–7.7)
NEUTROPHILS NFR BLD: 70 % (ref 38–73)
NONHDLC SERPL-MCNC: 138 MG/DL
NRBC BLD-RTO: 0 /100 WBC
PLATELET # BLD AUTO: 337 K/UL (ref 150–450)
PMV BLD AUTO: 11.1 FL (ref 9.2–12.9)
POTASSIUM SERPL-SCNC: 3.8 MMOL/L (ref 3.5–5.1)
PROT SERPL-MCNC: 7.6 G/DL (ref 6–8.4)
RBC # BLD AUTO: 5.02 M/UL (ref 4–5.4)
SODIUM SERPL-SCNC: 141 MMOL/L (ref 136–145)
TRIGL SERPL-MCNC: 79 MG/DL (ref 30–150)
TSH SERPL DL<=0.005 MIU/L-ACNC: 1.75 UIU/ML (ref 0.4–4)
WBC # BLD AUTO: 8.93 K/UL (ref 3.9–12.7)

## 2023-11-06 ENCOUNTER — PATIENT MESSAGE (OUTPATIENT)
Dept: INTERNAL MEDICINE | Facility: CLINIC | Age: 28
End: 2023-11-06
Payer: COMMERCIAL

## 2023-12-03 DIAGNOSIS — F90.0 ADHD (ATTENTION DEFICIT HYPERACTIVITY DISORDER), INATTENTIVE TYPE: ICD-10-CM

## 2023-12-03 DIAGNOSIS — F90.0 ATTENTION DEFICIT HYPERACTIVITY DISORDER (ADHD), PREDOMINANTLY INATTENTIVE TYPE: ICD-10-CM

## 2023-12-04 RX ORDER — DEXTROAMPHETAMINE SACCHARATE, AMPHETAMINE ASPARTATE MONOHYDRATE, DEXTROAMPHETAMINE SULFATE AND AMPHETAMINE SULFATE 5; 5; 5; 5 MG/1; MG/1; MG/1; MG/1
20 CAPSULE, EXTENDED RELEASE ORAL DAILY
Qty: 30 CAPSULE | Refills: 0 | Status: SHIPPED | OUTPATIENT
Start: 2023-12-04 | End: 2024-01-29 | Stop reason: SDUPTHER

## 2024-01-29 DIAGNOSIS — F90.0 ADHD (ATTENTION DEFICIT HYPERACTIVITY DISORDER), INATTENTIVE TYPE: ICD-10-CM

## 2024-01-29 DIAGNOSIS — F90.0 ATTENTION DEFICIT HYPERACTIVITY DISORDER (ADHD), PREDOMINANTLY INATTENTIVE TYPE: ICD-10-CM

## 2024-01-29 NOTE — TELEPHONE ENCOUNTER
No care due was identified.  [facility] Embedded Care Due Messages. Reference number: [id number].   1/29/2024 1:32:57 PM CST

## 2024-01-30 RX ORDER — DEXTROAMPHETAMINE SACCHARATE, AMPHETAMINE ASPARTATE MONOHYDRATE, DEXTROAMPHETAMINE SULFATE AND AMPHETAMINE SULFATE 5; 5; 5; 5 MG/1; MG/1; MG/1; MG/1
20 CAPSULE, EXTENDED RELEASE ORAL DAILY
Qty: 30 CAPSULE | Refills: 0 | Status: SHIPPED | OUTPATIENT
Start: 2024-01-30 | End: 2024-03-05 | Stop reason: SDUPTHER

## 2024-03-05 DIAGNOSIS — F90.0 ADHD (ATTENTION DEFICIT HYPERACTIVITY DISORDER), INATTENTIVE TYPE: ICD-10-CM

## 2024-03-05 DIAGNOSIS — F90.0 ATTENTION DEFICIT HYPERACTIVITY DISORDER (ADHD), PREDOMINANTLY INATTENTIVE TYPE: ICD-10-CM

## 2024-03-05 RX ORDER — DEXTROAMPHETAMINE SACCHARATE, AMPHETAMINE ASPARTATE MONOHYDRATE, DEXTROAMPHETAMINE SULFATE AND AMPHETAMINE SULFATE 5; 5; 5; 5 MG/1; MG/1; MG/1; MG/1
20 CAPSULE, EXTENDED RELEASE ORAL DAILY
Qty: 30 CAPSULE | Refills: 0 | Status: SHIPPED | OUTPATIENT
Start: 2024-03-05

## 2024-03-05 NOTE — TELEPHONE ENCOUNTER
No care due was identified.  [facility] Embedded Care Due Messages. Reference number: [id number].   3/05/2024 8:14:05 AM CST

## 2024-06-13 DIAGNOSIS — Z71.84 ENCOUNTER FOR HEALTH COUNSELING RELATED TO TRAVEL: ICD-10-CM

## 2024-06-13 DIAGNOSIS — Z29.89 NEED FOR MALARIA PROPHYLAXIS: ICD-10-CM

## 2024-06-13 DIAGNOSIS — F90.0 ATTENTION DEFICIT HYPERACTIVITY DISORDER (ADHD), PREDOMINANTLY INATTENTIVE TYPE: ICD-10-CM

## 2024-06-13 DIAGNOSIS — F90.0 ADHD (ATTENTION DEFICIT HYPERACTIVITY DISORDER), INATTENTIVE TYPE: ICD-10-CM

## 2024-06-13 RX ORDER — MEFLOQUINE HYDROCHLORIDE 250 MG/1
250 TABLET ORAL
Qty: 5 TABLET | Refills: 0 | Status: SHIPPED | OUTPATIENT
Start: 2024-06-13

## 2024-06-13 RX ORDER — DEXTROAMPHETAMINE SACCHARATE, AMPHETAMINE ASPARTATE MONOHYDRATE, DEXTROAMPHETAMINE SULFATE AND AMPHETAMINE SULFATE 5; 5; 5; 5 MG/1; MG/1; MG/1; MG/1
20 CAPSULE, EXTENDED RELEASE ORAL DAILY
Qty: 30 CAPSULE | Refills: 0 | OUTPATIENT
Start: 2024-06-13

## 2024-06-13 NOTE — TELEPHONE ENCOUNTER
Refill Routing Note   Medication(s) are not appropriate for processing by [facility] for the following reason(s):        Non-participating provider    [facility] action(s):  Route               Appointments  past 12m or future 3m with PCP    Date Provider   Last Visit   11/3/2023 Lucretia Delgado, NP   Next Visit   Visit date not found Lucretia Delgado, NP   ED visits in past 90 days: 0        Note composed:11:01 AM 06/13/2024

## 2024-06-13 NOTE — TELEPHONE ENCOUNTER
No care due was identified.  [facility] Embedded Care Due Messages. Reference number: [id number].   6/13/2024 10:40:46 AM CDT

## 2024-06-26 ENCOUNTER — PATIENT MESSAGE (OUTPATIENT)
Dept: INTERNAL MEDICINE | Facility: CLINIC | Age: 29
End: 2024-06-26
Payer: COMMERCIAL

## 2024-06-26 DIAGNOSIS — Z71.84 ENCOUNTER FOR HEALTH COUNSELING RELATED TO TRAVEL: ICD-10-CM

## 2024-06-26 DIAGNOSIS — Z29.89 NEED FOR MALARIA PROPHYLAXIS: ICD-10-CM

## 2024-06-26 RX ORDER — MEFLOQUINE HYDROCHLORIDE 250 MG/1
250 TABLET ORAL
Qty: 5 TABLET | Refills: 0 | Status: SHIPPED | OUTPATIENT
Start: 2024-06-26

## 2024-06-26 NOTE — TELEPHONE ENCOUNTER
----- Message from Lali Martinez sent at 6/26/2024  9:21 AM CDT -----  Contact: [phone]  Type:  RX Refill Request    Who Called: DEE VILLALOBOS [[id number]]  Refill or New Rx:REFILL  RX Name and Strength:mefloquine (LARIAM) 250 mg tablet  How is the patient currently taking it? (ex. 1XDay):  Is this a 30 day or 90 day RX:  Preferred Pharmacy with phone number:[phone]  Local or Mail Order:local  Ordering Provider:LIZZIE  Would the patient rather a call back or a response via [facility]? Call back  Best Call Back Number:[phone]  Additional Information: [id number]      NEW PHARMACY / GOING OUT OF TOWN!!!!    [facility]  [address]    [phone]

## 2024-07-22 ENCOUNTER — OFFICE VISIT (OUTPATIENT)
Dept: INTERNAL MEDICINE | Facility: CLINIC | Age: 29
End: 2024-07-22
Payer: COMMERCIAL

## 2024-07-22 ENCOUNTER — PATIENT MESSAGE (OUTPATIENT)
Dept: INTERNAL MEDICINE | Facility: CLINIC | Age: 29
End: 2024-07-22
Payer: COMMERCIAL

## 2024-07-22 DIAGNOSIS — F90.0 ADHD (ATTENTION DEFICIT HYPERACTIVITY DISORDER), INATTENTIVE TYPE: Primary | ICD-10-CM

## 2024-07-22 DIAGNOSIS — F90.0 ATTENTION DEFICIT HYPERACTIVITY DISORDER (ADHD), PREDOMINANTLY INATTENTIVE TYPE: ICD-10-CM

## 2024-07-22 PROCEDURE — 1159F MED LIST DOCD IN RCRD: CPT | Mod: CPTII,95,, | Performed by: FAMILY MEDICINE

## 2024-07-22 PROCEDURE — 1160F RVW MEDS BY RX/DR IN RCRD: CPT | Mod: CPTII,95,, | Performed by: FAMILY MEDICINE

## 2024-07-22 PROCEDURE — 99213 OFFICE O/P EST LOW 20 MIN: CPT | Mod: 95,,, | Performed by: FAMILY MEDICINE

## 2024-07-22 RX ORDER — DEXTROAMPHETAMINE SACCHARATE, AMPHETAMINE ASPARTATE MONOHYDRATE, DEXTROAMPHETAMINE SULFATE AND AMPHETAMINE SULFATE 5; 5; 5; 5 MG/1; MG/1; MG/1; MG/1
20 CAPSULE, EXTENDED RELEASE ORAL DAILY
Qty: 30 CAPSULE | Refills: 0 | Status: SHIPPED | OUTPATIENT
Start: 2024-07-22

## 2024-07-22 NOTE — PROGRESS NOTES
Allyssa Leong  2024  [id number]    Diana Castaneda MD  Patient Care Team:  Diana Castaneda MD as PCP - General (Family Medicine)  Adalgisa Villatoro LPN as Care Coordinator  Guider, MD Mary Ann as Consulting Physician (Gastroenterology)      The patient location is: home  The chief complaint leading to consultation is: Med check    Visit type: audiovisual    Face to Face time with patient: 11:40  10 minutes of total time spent on the encounter, which includes face to face time and non-face to face time preparing to see the patient (eg, review of tests), Obtaining and/or reviewing separately obtained history, Documenting clinical information in the electronic or other health record, Independently interpreting results (not separately reported) and communicating results to the patient/family/caregiver, or Care coordination (not separately reported).         Each patient to whom he or she provides medical services by telemedicine is:  (1) informed of the relationship between the physician and patient and the respective role of any other health care provider with respect to management of the patient; and (2) notified that he or she may decline to receive medical services by telemedicine and may withdraw from such care at any time.    Notes:      Visit Type:a scheduled routine follow-up visit    Chief Complaint:  No chief complaint on file.      History of Present Illness:  28 year old  Med check    On adderall 20 Xr  Focus.  Denies any CP or SOB.  Mood stable on meds.    She reports her grandma passed away in Dec.  She has some grief.  Friend  in place crash.  Reports typical grief, no overt depression.    She said she lapse on her Rx.  She is in clinics. Needs help with focus. She is in [employer].       reviewed  Answers submitted by the patient for this visit:  Review of Systems Questionnaire (Submitted on 2024)  activity change: No  trouble swallowing: No  chest tightness: No  difficulty  urinating: No  dysphoric mood: No        History:  Past Medical History:   Diagnosis Date    ADHD (attention deficit hyperactivity disorder), inattentive type 8/22/2022    Depression with anxiety 6/24/2020    Endometriosis      No past surgical history on file.  Family History   Problem Relation Name Age of Onset    Breast cancer Mother      Cancer Father          [place] Sarcoma    Autism Brother      ADD / ADHD Brother       Social History     Socioeconomic History    Marital status: Single   Tobacco Use    Smoking status: Never    Smokeless tobacco: Never   Substance and Sexual Activity    Alcohol use: No     Social Determinants of Health     Financial Resource Strain: Low Risk  (7/22/2024)    Overall Financial Resource Strain (CARDIA)     Difficulty of Paying Living Expenses: Not very hard   Food Insecurity: No Food Insecurity (7/22/2024)    Hunger Vital Sign     Worried About Running Out of Food in the Last Year: Never true     Ran Out of Food in the Last Year: Never true   Transportation Needs: No Transportation Needs (11/3/2023)    PRAPARE - Transportation     Lack of Transportation (Medical): No     Lack of Transportation (Non-Medical): No   Physical Activity: Insufficiently Active (7/22/2024)    Exercise Vital Sign     Days of Exercise per Week: 3 days     Minutes of Exercise per Session: 20 min   Stress: Stress Concern Present (7/22/2024)    [employer] - Occupational Stress Questionnaire     Feeling of Stress : To some extent   Housing Stability: Low Risk  (11/3/2023)    Housing Stability Vital Sign     Unable to Pay for Housing in the Last Year: No     Number of Places Lived in the Last Year: 2     Unstable Housing in the Last Year: No     Patient Active Problem List   Diagnosis    Cyst of breast, left, solitary    Dysmenorrhea    Dyspareunia, female    Encounter for gynecological examination without abnormal finding    Menorrhagia with regular cycle    Pelvic pain in female     Vaginospasm    Vitamin D insufficiency    Depression with anxiety    Endometriosis determined by laparoscopy    ADHD (attention deficit hyperactivity disorder), inattentive type     Review of patient's allergies indicates:   Allergen Reactions    Penicillins Other (See Comments) and Hives     unknown       The following were reviewed at this visit: active problem list, medication list, allergies, family history, social history, and health maintenance.    Medications:  Current Outpatient Medications on File Prior to Visit   Medication Sig Dispense Refill    ergocalciferol (ERGOCALCIFEROL) 50,000 unit Cap TAKE 1 CAPSULE BY MOUTH EVERY 7 DAYS FOR 12 DOSES 12 capsule 0    famotidine (PEPCID) 20 MG tablet Take 1 tablet (20 mg total) by mouth once daily. 14 tablet 0    [DISCONTINUED] dextroamphetamine-amphetamine (ADDERALL XR) 20 MG 24 hr capsule Take 1 capsule (20 mg total) by mouth once daily. 30 capsule 0    [DISCONTINUED] mefloquine (LARIAM) 250 mg tablet Take 1 tablet (250 mg total) by mouth every 7 days. 5 tablet 0     No current facility-administered medications on file prior to visit.       Medications have been reviewed and reconciled with patient at this visit.  Barriers to medications reviewed with patient.    Adverse reactions to current medications reviewed with patient..    Over the counter medications reviewed and reconciled with patient.    Exam:  Wt Readings from Last 3 Encounters:   11/03/23 95.6 kg (210 lb 12.2 oz)   04/21/23 86.2 kg (190 lb)   02/14/23 90.7 kg (200 lb)     Temp Readings from Last 3 Encounters:   11/03/23 98.8 °F (37.1 °C) (Tympanic)   09/12/22 98.4 °F (36.9 °C) (Temporal)   06/20/22 98.4 °F (36.9 °C)     BP Readings from Last 3 Encounters:   11/03/23 114/80   09/12/22 110/80   06/20/22 125/85     Pulse Readings from Last 3 Encounters:   11/03/23 94   09/12/22 86   06/20/22 84     There is no height or weight on file to calculate BMI.      Review of Systems   HENT:  Negative for  hearing loss.    Eyes:  Negative for discharge.   Respiratory:  Negative for wheezing.    Cardiovascular:  Negative for chest pain and palpitations.   Gastrointestinal:  Negative for constipation, diarrhea and vomiting.   Genitourinary:  Negative for hematuria.   Neurological:  Negative for headaches.     Physical Exam  Nursing note reviewed.   Pulmonary:      Effort: Pulmonary effort is normal. No respiratory distress.   Neurological:      Mental Status: She is alert and oriented to person, place, and time.   Psychiatric:         Mood and Affect: Mood normal.         Behavior: Behavior normal.         Thought Content: Thought content normal.         Judgment: Judgment normal.         Laboratory Reviewed ({Yes)  Lab Results   Component Value Date    WBC 8.93 11/03/2023    HGB 12.2 11/03/2023    HCT 39.9 11/03/2023     11/03/2023    CHOL 189 11/03/2023    TRIG 79 11/03/2023    HDL 51 11/03/2023    ALT 15 11/03/2023    AST 17 11/03/2023     11/03/2023    K 3.8 11/03/2023     11/03/2023    CREATININE 0.8 11/03/2023    BUN 13 11/03/2023    CO2 25 11/03/2023    TSH 1.751 11/03/2023    HGBA1C 5.2 08/21/2020       Diagnoses and all orders for this visit:    ADHD (attention deficit hyperactivity disorder), inattentive type  -     dextroamphetamine-amphetamine (ADDERALL XR) 20 MG 24 hr capsule; Take 1 capsule (20 mg total) by mouth once daily.    Attention deficit hyperactivity disorder (ADHD), predominantly inattentive type  -     dextroamphetamine-amphetamine (ADDERALL XR) 20 MG 24 hr capsule; Take 1 capsule (20 mg total) by mouth once daily.      Recheck 3-4 months  Call for refill or message            Care Plan/Goals: Reviewed    Goals    None         Follow up: No follow-ups on file.    After visit summary was printed and given to patient upon discharge today.  Patient goals and care plan are included in After Visit Summary.

## 2024-08-27 DIAGNOSIS — F90.0 ATTENTION DEFICIT HYPERACTIVITY DISORDER (ADHD), PREDOMINANTLY INATTENTIVE TYPE: ICD-10-CM

## 2024-08-27 DIAGNOSIS — F90.0 ADHD (ATTENTION DEFICIT HYPERACTIVITY DISORDER), INATTENTIVE TYPE: ICD-10-CM

## 2024-08-27 RX ORDER — DEXTROAMPHETAMINE SACCHARATE, AMPHETAMINE ASPARTATE MONOHYDRATE, DEXTROAMPHETAMINE SULFATE AND AMPHETAMINE SULFATE 5; 5; 5; 5 MG/1; MG/1; MG/1; MG/1
20 CAPSULE, EXTENDED RELEASE ORAL DAILY
Qty: 30 CAPSULE | Refills: 0 | Status: SHIPPED | OUTPATIENT
Start: 2024-08-27

## 2024-08-27 NOTE — TELEPHONE ENCOUNTER
No care due was identified.  [facility] Embedded Care Due Messages. Reference number: [id number].   8/27/2024 3:25:05 PM CDT

## 2024-10-17 DIAGNOSIS — F90.0 ATTENTION DEFICIT HYPERACTIVITY DISORDER (ADHD), PREDOMINANTLY INATTENTIVE TYPE: ICD-10-CM

## 2024-10-17 DIAGNOSIS — F90.0 ADHD (ATTENTION DEFICIT HYPERACTIVITY DISORDER), INATTENTIVE TYPE: ICD-10-CM

## 2024-10-17 RX ORDER — DEXTROAMPHETAMINE SACCHARATE, AMPHETAMINE ASPARTATE MONOHYDRATE, DEXTROAMPHETAMINE SULFATE AND AMPHETAMINE SULFATE 5; 5; 5; 5 MG/1; MG/1; MG/1; MG/1
20 CAPSULE, EXTENDED RELEASE ORAL DAILY
Qty: 30 CAPSULE | Refills: 0 | Status: SHIPPED | OUTPATIENT
Start: 2024-10-17

## 2024-10-17 NOTE — TELEPHONE ENCOUNTER
No care due was identified.  [facility] Embedded Care Due Messages. Reference number: [id number].   10/17/2024 8:59:28 AM CDT

## 2024-12-26 DIAGNOSIS — F90.0 ADHD (ATTENTION DEFICIT HYPERACTIVITY DISORDER), INATTENTIVE TYPE: ICD-10-CM

## 2024-12-26 DIAGNOSIS — F90.0 ATTENTION DEFICIT HYPERACTIVITY DISORDER (ADHD), PREDOMINANTLY INATTENTIVE TYPE: ICD-10-CM

## 2024-12-26 NOTE — TELEPHONE ENCOUNTER
No care due was identified.  [facility] Embedded Care Due Messages. Reference number: [id number].   12/26/2024 11:00:35 AM CST

## 2024-12-26 NOTE — TELEPHONE ENCOUNTER
Refill Routing Note   Medication(s) are not appropriate for processing by [facility] for the following reason(s):        Outside of protocol    [facility] action(s):  Route               Appointments  past 12m or future 3m with PCP    Date Provider   Last Visit   7/22/2024 Diana Castaneda MD   Next Visit   Visit date not found Diana Castaneda MD   ED visits in past 90 days: 0        Note composed:5:18 PM 12/26/2024

## 2024-12-27 ENCOUNTER — PATIENT MESSAGE (OUTPATIENT)
Dept: INTERNAL MEDICINE | Facility: CLINIC | Age: 29
End: 2024-12-27
Payer: COMMERCIAL

## 2024-12-27 RX ORDER — DEXTROAMPHETAMINE SACCHARATE, AMPHETAMINE ASPARTATE MONOHYDRATE, DEXTROAMPHETAMINE SULFATE AND AMPHETAMINE SULFATE 5; 5; 5; 5 MG/1; MG/1; MG/1; MG/1
20 CAPSULE, EXTENDED RELEASE ORAL DAILY
Qty: 30 CAPSULE | Refills: 0 | OUTPATIENT
Start: 2024-12-27

## 2025-01-08 ENCOUNTER — PATIENT MESSAGE (OUTPATIENT)
Dept: INTERNAL MEDICINE | Facility: CLINIC | Age: 30
End: 2025-01-08
Payer: COMMERCIAL

## 2025-01-09 ENCOUNTER — OFFICE VISIT (OUTPATIENT)
Dept: INTERNAL MEDICINE | Facility: CLINIC | Age: 30
End: 2025-01-09
Payer: COMMERCIAL

## 2025-01-09 ENCOUNTER — LAB VISIT (OUTPATIENT)
Dept: LAB | Facility: HOSPITAL | Age: 30
End: 2025-01-09
Attending: FAMILY MEDICINE
Payer: COMMERCIAL

## 2025-01-09 VITALS
HEIGHT: 65 IN | WEIGHT: 196.19 LBS | DIASTOLIC BLOOD PRESSURE: 68 MMHG | BODY MASS INDEX: 32.69 KG/M2 | TEMPERATURE: 98 F | SYSTOLIC BLOOD PRESSURE: 126 MMHG | OXYGEN SATURATION: 99 % | HEART RATE: 84 BPM

## 2025-01-09 DIAGNOSIS — Z00.00 ANNUAL PHYSICAL EXAM: ICD-10-CM

## 2025-01-09 DIAGNOSIS — E55.9 VITAMIN D DEFICIENCY: ICD-10-CM

## 2025-01-09 DIAGNOSIS — F90.0 ATTENTION DEFICIT HYPERACTIVITY DISORDER (ADHD), PREDOMINANTLY INATTENTIVE TYPE: ICD-10-CM

## 2025-01-09 DIAGNOSIS — Z00.00 ANNUAL PHYSICAL EXAM: Primary | ICD-10-CM

## 2025-01-09 DIAGNOSIS — F90.0 ADHD (ATTENTION DEFICIT HYPERACTIVITY DISORDER), INATTENTIVE TYPE: ICD-10-CM

## 2025-01-09 DIAGNOSIS — Z23 NEED FOR VACCINATION: ICD-10-CM

## 2025-01-09 LAB — 25(OH)D3+25(OH)D2 SERPL-MCNC: 14 NG/ML (ref 30–96)

## 2025-01-09 PROCEDURE — 90656 IIV3 VACC NO PRSV 0.5 ML IM: CPT | Mod: S$GLB,,, | Performed by: FAMILY MEDICINE

## 2025-01-09 PROCEDURE — 36415 COLL VENOUS BLD VENIPUNCTURE: CPT | Performed by: FAMILY MEDICINE

## 2025-01-09 PROCEDURE — 99395 PREV VISIT EST AGE 18-39: CPT | Mod: 25,S$GLB,, | Performed by: FAMILY MEDICINE

## 2025-01-09 PROCEDURE — 3078F DIAST BP <80 MM HG: CPT | Mod: CPTII,S$GLB,, | Performed by: FAMILY MEDICINE

## 2025-01-09 PROCEDURE — 3074F SYST BP LT 130 MM HG: CPT | Mod: CPTII,S$GLB,, | Performed by: FAMILY MEDICINE

## 2025-01-09 PROCEDURE — 1160F RVW MEDS BY RX/DR IN RCRD: CPT | Mod: CPTII,S$GLB,, | Performed by: FAMILY MEDICINE

## 2025-01-09 PROCEDURE — 90471 IMMUNIZATION ADMIN: CPT | Mod: S$GLB,,, | Performed by: FAMILY MEDICINE

## 2025-01-09 PROCEDURE — 82306 VITAMIN D 25 HYDROXY: CPT | Performed by: FAMILY MEDICINE

## 2025-01-09 PROCEDURE — 3008F BODY MASS INDEX DOCD: CPT | Mod: CPTII,S$GLB,, | Performed by: FAMILY MEDICINE

## 2025-01-09 PROCEDURE — 99999 PR PBB SHADOW E&M-EST. PATIENT-LVL III: CPT | Mod: PBBFAC,,, | Performed by: FAMILY MEDICINE

## 2025-01-09 PROCEDURE — 1159F MED LIST DOCD IN RCRD: CPT | Mod: CPTII,S$GLB,, | Performed by: FAMILY MEDICINE

## 2025-01-09 RX ORDER — DEXTROAMPHETAMINE SACCHARATE, AMPHETAMINE ASPARTATE MONOHYDRATE, DEXTROAMPHETAMINE SULFATE AND AMPHETAMINE SULFATE 5; 5; 5; 5 MG/1; MG/1; MG/1; MG/1
20 CAPSULE, EXTENDED RELEASE ORAL DAILY
Qty: 30 CAPSULE | Refills: 0 | Status: SHIPPED | OUTPATIENT
Start: 2025-01-09

## 2025-01-09 NOTE — PROGRESS NOTES
Allyssa Leong  01/09/2025  [id number]    Diana Castaneda MD  Patient Care Team:  Diana Castaneda MD as PCP - General (Family Medicine)  Guider, MD Mary Ann as Consulting Physician (Gastroenterology)          Visit Type:a scheduled routine follow-up visit    Chief Complaint:  Chief Complaint   Patient presents with    Annual Exam       History of Present Illness:    History of Present Illness    CHIEF COMPLAINT:  Ms. Leong presents today for an annual follow-up and medication refill.    LABS:  Vitamin D level was very low. Triglyceride levels were at the high end of normal range.    FAMILY HISTORY:  Family history is significant for hypertriglyceridemia.        She has history of ADHD inattentive  Has been stable on Adderall 20XR  Needs refill    Entering into [employer] [employer]. Possible move to [place].      The following were reviewed at this visit: active problem list, medication list, allergies, family history, social history, and health maintenance.  History:  Past Medical History:   Diagnosis Date    ADHD (attention deficit hyperactivity disorder), inattentive type 8/22/2022    Depression with anxiety 6/24/2020    Endometriosis      No past surgical history on file.  Patient Active Problem List   Diagnosis    Cyst of breast, left, solitary    Dysmenorrhea    Dyspareunia, female    Encounter for gynecological examination without abnormal finding    Menorrhagia with regular cycle    Pelvic pain in female    Vaginospasm    Vitamin D insufficiency    Depression with anxiety    Endometriosis determined by laparoscopy    ADHD (attention deficit hyperactivity disorder), inattentive type       Medications:  Current Outpatient Medications on File Prior to Visit   Medication Sig Dispense Refill    [DISCONTINUED] dextroamphetamine-amphetamine (ADDERALL XR) 20 MG 24 hr capsule Take 1 capsule (20 mg total) by mouth once daily. 30 capsule 0    ergocalciferol (ERGOCALCIFEROL) 50,000 unit Cap TAKE 1 CAPSULE BY MOUTH  EVERY 7 DAYS FOR 12 DOSES (Patient not taking: Reported on 1/9/2025) 12 capsule 0    famotidine (PEPCID) 20 MG tablet Take 1 tablet (20 mg total) by mouth once daily. 14 tablet 0     No current facility-administered medications on file prior to visit.       Medications have been reviewed and reconciled with patient at this visit.    Exam:  Wt Readings from Last 3 Encounters:   01/09/25 89 kg (196 lb 3.4 oz)   11/03/23 95.6 kg (210 lb 12.2 oz)   04/21/23 86.2 kg (190 lb)     Temp Readings from Last 3 Encounters:   01/09/25 98.4 °F (36.9 °C) (Tympanic)   11/03/23 98.8 °F (37.1 °C) (Tympanic)   09/12/22 98.4 °F (36.9 °C) (Temporal)     BP Readings from Last 3 Encounters:   01/09/25 126/68   11/03/23 114/80   09/12/22 110/80     Pulse Readings from Last 3 Encounters:   01/09/25 84   11/03/23 94   09/12/22 86     Body mass index is 32.65 kg/m².      Review of Systems   HENT:  Negative for hearing loss.    Eyes:  Negative for discharge.   Respiratory:  Negative for wheezing.    Cardiovascular:  Negative for chest pain and palpitations.   Gastrointestinal:  Negative for constipation, diarrhea and vomiting.   Genitourinary:  Negative for hematuria.   Neurological:  Negative for headaches.     Physical Exam  Nursing note reviewed.   Cardiovascular:      Rate and Rhythm: Normal rate and regular rhythm.   Pulmonary:      Effort: Pulmonary effort is normal. No respiratory distress.   Neurological:      Mental Status: She is alert and oriented to person, place, and time.   Psychiatric:         Mood and Affect: Mood normal.         Behavior: Behavior normal.         Thought Content: Thought content normal.         Judgment: Judgment normal.       Physical Exam             Laboratory Reviewed ({Yes)    Lab Results   Component Value Date    WBC 8.93 11/03/2023    HGB 12.2 11/03/2023    HCT 39.9 11/03/2023     11/03/2023    CHOL 189 11/03/2023    TRIG 79 11/03/2023    HDL 51 11/03/2023    ALT 15 11/03/2023    AST 17  11/03/2023     11/03/2023    K 3.8 11/03/2023     11/03/2023    CREATININE 0.8 11/03/2023    BUN 13 11/03/2023    CO2 25 11/03/2023    TSH 1.751 11/03/2023    HGBA1C 5.2 08/21/2020       Assessment & Plan    Z00.01 Encounter for general adult medical exam with abnormal findings  Z76.0 Encounter for issue of repeat prescription- Adderall RX  E55.9 Vitamin D deficiency, unspecified  E78.5 Hyperlipidemia, unspecified  Z23 Encounter for immunization     Reviewed patient's medication regimen and determined refill was appropriate    Considered patient's family history of hypertriglyceridemia in relation to current triglyceride levels    Evaluated need for annual bloodwork, including vitamin D level due to previous low result    Assessed patient's immunization status and recommended flu vaccination    ANNUAL HEALTH MAINTENANCE:  - Ordered comprehensive annual bloodwork, including vitamin D levels and lipid panel.  - Performed physical exam and noted no cardiac murmurs.  - Inquired about the patient's flu vaccine status.    VITAMIN D DEFICIENCY:  - Ordered vitamin D level test as part of annual bloodwork due to previously low levels.    HYPERLIPIDEMIA:  - Ordered lipid panel as part of annual bloodwork.  - Noted patient's family history of hypertriglyceridemia and previous triglyceride levels.  - Will review lipid panel results from current labs during follow-up.    MEDICATIONS/SUPPLEMENTS:  - Continued current medication regimen and refilled prescriptions.ADHD stable  Refilled medicine.  No mood changes. Weight same.BP good      FLU VACCINATION:  - Administered free flu vaccine in office.    FOLLOW UP:  - Scheduled follow-up to review lab results.  - Offered the patient a choice between in-person visit or virtual appointment for follow-up.       Allyssa was seen today for annual exam.    Diagnoses and all orders for this visit:    Annual physical exam  -     Vitamin D; Future    ADHD (attention deficit  hyperactivity disorder), inattentive type  -     dextroamphetamine-amphetamine (ADDERALL XR) 20 MG 24 hr capsule; Take 1 capsule (20 mg total) by mouth once daily.    Vitamin D deficiency  -     Vitamin D; Future    Attention deficit hyperactivity disorder (ADHD), predominantly inattentive type  -     dextroamphetamine-amphetamine (ADDERALL XR) 20 MG 24 hr capsule; Take 1 capsule (20 mg total) by mouth once daily.                Care Plan/Goals: Reviewed     Follow up: Follow up in about 3 months (around 4/9/2025).    After visit summary was printed and given to patient upon discharge today.  Patient goals and care plan are included in After Visit Summary.    This note was generated with the assistance of ambient listening technology. Verbal consent was obtained by the patient and accompanying visitor(s) for the recording of patient appointment to facilitate this note. I attest to having reviewed and edited the generated note for accuracy, though some syntax or spelling errors may persist. Please contact the author of this note for any clarification.     Answers submitted by the patient for this visit:  Review of Systems Questionnaire (Submitted on 1/8/2025)  activity change: No  trouble swallowing: No  chest tightness: No  difficulty urinating: No  dysphoric mood: No

## 2025-01-10 DIAGNOSIS — E55.9 VITAMIN D INSUFFICIENCY: ICD-10-CM

## 2025-01-10 RX ORDER — ERGOCALCIFEROL 1.25 MG/1
50000 CAPSULE ORAL WEEKLY
Qty: 12 CAPSULE | Refills: 0 | Status: SHIPPED | OUTPATIENT
Start: 2025-01-10

## 2025-02-19 ENCOUNTER — PATIENT MESSAGE (OUTPATIENT)
Dept: INTERNAL MEDICINE | Facility: CLINIC | Age: 30
End: 2025-02-19
Payer: COMMERCIAL

## 2025-02-21 DIAGNOSIS — F90.0 ATTENTION DEFICIT HYPERACTIVITY DISORDER (ADHD), PREDOMINANTLY INATTENTIVE TYPE: ICD-10-CM

## 2025-02-21 DIAGNOSIS — F90.0 ADHD (ATTENTION DEFICIT HYPERACTIVITY DISORDER), INATTENTIVE TYPE: ICD-10-CM

## 2025-02-21 RX ORDER — DEXTROAMPHETAMINE SACCHARATE, AMPHETAMINE ASPARTATE MONOHYDRATE, DEXTROAMPHETAMINE SULFATE AND AMPHETAMINE SULFATE 5; 5; 5; 5 MG/1; MG/1; MG/1; MG/1
20 CAPSULE, EXTENDED RELEASE ORAL DAILY
Qty: 30 CAPSULE | Refills: 0 | Status: SHIPPED | OUTPATIENT
Start: 2025-02-21

## 2025-02-21 NOTE — TELEPHONE ENCOUNTER
Refill Routing Note   Medication(s) are not appropriate for processing by [facility] for the following reason(s):        Outside of protocol    [facility] action(s):  Route             Appointments  past 12m or future 3m with PCP    Date Provider   Last Visit   1/9/2025 Diana Castaneda MD   Next Visit   4/24/2025 Diana Castaneda MD   ED visits in past 90 days: 0        Note composed:1:25 PM 02/21/2025

## 2025-02-21 NOTE — TELEPHONE ENCOUNTER
Care Due:                  Date            Visit Type   Department     Provider  --------------------------------------------------------------------------------                                MYCHART                              FOLLOWUP/OF  [facility]  Last Visit: 01-      [facility] VISIT   MEDICINE       Diana Castaneda                              EP -                              PRIMARY      [facility]  Next Visit: 04-      CARE ([facility])   [facility]       Diana Castaneda                                                            Last  Test          Frequency    Reason                     Performed    Due Date  --------------------------------------------------------------------------------    Ca..........  12 months..  ergocalciferol...........  11-   10-    [facility] Embedded Care Due Messages. Reference number: [id number].   2/21/2025 8:30:48 AM CST

## 2025-04-08 DIAGNOSIS — F90.0 ADHD (ATTENTION DEFICIT HYPERACTIVITY DISORDER), INATTENTIVE TYPE: ICD-10-CM

## 2025-04-08 DIAGNOSIS — F90.0 ATTENTION DEFICIT HYPERACTIVITY DISORDER (ADHD), PREDOMINANTLY INATTENTIVE TYPE: ICD-10-CM

## 2025-04-08 RX ORDER — DEXTROAMPHETAMINE SACCHARATE, AMPHETAMINE ASPARTATE MONOHYDRATE, DEXTROAMPHETAMINE SULFATE AND AMPHETAMINE SULFATE 5; 5; 5; 5 MG/1; MG/1; MG/1; MG/1
20 CAPSULE, EXTENDED RELEASE ORAL DAILY
Qty: 30 CAPSULE | Refills: 0 | Status: SHIPPED | OUTPATIENT
Start: 2025-04-08

## 2025-04-08 NOTE — TELEPHONE ENCOUNTER
No care due was identified.  [facility] Embedded Care Due Messages. Reference number: [id number].   4/08/2025 7:52:04 AM CDT